# Patient Record
Sex: FEMALE | Race: BLACK OR AFRICAN AMERICAN | Employment: UNEMPLOYED | URBAN - METROPOLITAN AREA
[De-identification: names, ages, dates, MRNs, and addresses within clinical notes are randomized per-mention and may not be internally consistent; named-entity substitution may affect disease eponyms.]

---

## 2017-12-27 ENCOUNTER — HOSPITAL ENCOUNTER (OUTPATIENT)
Age: 22
Discharge: HOME | End: 2017-12-27
Payer: COMMERCIAL

## 2017-12-27 LAB — HCG QUANTITATIVE: <1 IU/L

## 2017-12-27 PROCEDURE — 36415 COLL VENOUS BLD VENIPUNCTURE: CPT

## 2017-12-27 PROCEDURE — 84702 CHORIONIC GONADOTROPIN TEST: CPT

## 2018-09-12 ENCOUNTER — HOSPITAL ENCOUNTER (EMERGENCY)
Age: 23
Discharge: HOME | End: 2018-09-12
Attending: EMERGENCY MEDICINE
Payer: COMMERCIAL

## 2018-09-12 VITALS
BODY MASS INDEX: 47.09 KG/M2 | DIASTOLIC BLOOD PRESSURE: 76 MMHG | HEART RATE: 105 BPM | RESPIRATION RATE: 17 BRPM | TEMPERATURE: 98.7 F | WEIGHT: 293 LBS | SYSTOLIC BLOOD PRESSURE: 141 MMHG | OXYGEN SATURATION: 100 % | HEIGHT: 66 IN

## 2018-09-12 DIAGNOSIS — K08.89 PAIN, DENTAL: Primary | ICD-10-CM

## 2018-09-12 PROCEDURE — 99282 EMERGENCY DEPT VISIT SF MDM: CPT

## 2018-09-12 RX ORDER — NAPROXEN 500 MG/1
500 TABLET ORAL 2 TIMES DAILY
Qty: 20 TABLET | Refills: 0 | Status: SHIPPED | OUTPATIENT
Start: 2018-09-12 | End: 2022-05-31

## 2018-09-12 RX ORDER — PENICILLIN V POTASSIUM 500 MG/1
500 TABLET ORAL 4 TIMES DAILY
Qty: 28 TABLET | Refills: 0 | Status: SHIPPED | OUTPATIENT
Start: 2018-09-12 | End: 2018-09-19

## 2018-09-12 ASSESSMENT — PAIN SCALES - GENERAL: PAINLEVEL_OUTOF10: 10

## 2018-09-12 ASSESSMENT — PAIN DESCRIPTION - PAIN TYPE: TYPE: ACUTE PAIN

## 2018-09-12 NOTE — ED PROVIDER NOTES
[address]  eMERGENCY dEPARTMENT eNCOUnter   Independent Attestation     Pt Name: Mignon Kaur  MRN: [id number]  [place] 1995  Date of evaluation: 9/12/18       Mignon Kaur is a 20 y.o. female who presents with Dental Pain (right side. )        I was personally available for consultation in the Emergency Department. Olman Chauhan MD, [address], [address] [facility],[address]  Attending Emergency Physician                    Aristeo Patel MD  09/12/18 [phone]
oriented X 3. HEAD: Atraumatic, Normocephalic. EYES: Eyes are normal to inspection, Pupils equal, round and reactive to light. NECK: Normal ROM, No jugular venous distention, No meningeal signs, Cervical spine nontender. MOUTH:  + dental pain at right lower molar , with no signs of abscess formation or Adrián sign noted. No swelling involving the airway at all. RESPIRATORY CHEST: No respiratory distress. ABDOMEN: Abdomen is nontender, No distension. UPPER EXTREMITY: Inspection normal, No cyanosis. NEURO: GCS is 15, Speech normal, Memory normal.   SKIN: Skin is warm, Skin is dry. PSYCHIATRIC: Oriented X 3, Normal affect. EMERGENCY DEPARTMENT COURSE:   Pain meds and antibiotic prescriptions. Pt provided with dental clinic list and instructed to call as soon as possible for an appointment. Instructed to return for worsening or any new symptoms including throat swelling, difficulty swallowing or breathing. Pt agrees. FINAL IMPRESSION:     1.  Pain, dental          DISPOSITION:  DISPOSITION Decision To Discharge 09/12/2018 12:56:18 PM        PATIENT REFERRED TO:  dental list    Schedule an appointment as soon as possible for a visit       [address] [facility]  [facility] [address]  [address] [facility] [address]  [phone]    For worsening symptoms, or any other concern      DISCHARGE MEDICATIONS:  Discharge Medication List as of 9/12/2018 12:58 PM      START taking these medications    Details   penicillin v potassium (VEETID) 500 MG tablet Take 1 tablet by mouth 4 times daily for 7 days, Disp-28 tablet, R-0Print      naproxen (NAPROSYN) 500 MG tablet Take 1 tablet by mouth 2 times daily, Disp-20 tablet, R-0Print             (Please note that portions of this note were completed with a voice recognition program.  Efforts were made to edit the dictations but occasionally words are mis-transcribed.)    Marina Campos, [address] [place] [address], [place]  09/12/18 7966

## 2022-05-31 ENCOUNTER — APPOINTMENT (OUTPATIENT)
Dept: CT IMAGING | Age: 27
DRG: 720 | End: 2022-05-31
Payer: COMMERCIAL

## 2022-05-31 ENCOUNTER — HOSPITAL ENCOUNTER (INPATIENT)
Age: 27
LOS: 2 days | Discharge: ANOTHER HOSPITAL | DRG: 720 | End: 2022-06-02
Attending: EMERGENCY MEDICINE | Admitting: FAMILY MEDICINE
Payer: COMMERCIAL

## 2022-05-31 DIAGNOSIS — J03.90 ACUTE TONSILLITIS, UNSPECIFIED ETIOLOGY: Primary | ICD-10-CM

## 2022-05-31 LAB
ABSOLUTE EOS #: <0.03 K/UL (ref 0–0.44)
ABSOLUTE IMMATURE GRANULOCYTE: 0.05 K/UL (ref 0–0.3)
ABSOLUTE LYMPH #: 2.67 K/UL (ref 1.1–3.7)
ABSOLUTE MONO #: 1.05 K/UL (ref 0.1–1.2)
ANION GAP SERPL CALCULATED.3IONS-SCNC: 11 MMOL/L (ref 9–17)
BASOPHILS # BLD: 0 % (ref 0–2)
BASOPHILS ABSOLUTE: <0.03 K/UL (ref 0–0.2)
BUN BLDV-MCNC: 7 MG/DL (ref 6–20)
BUN/CREAT BLD: 8 (ref 9–20)
CALCIUM SERPL-MCNC: 9.2 MG/DL (ref 8.6–10.4)
CHLORIDE BLD-SCNC: 102 MMOL/L (ref 98–107)
CHP ED QC CHECK: NORMAL
CO2: 26 MMOL/L (ref 20–31)
CREAT SERPL-MCNC: 0.93 MG/DL (ref 0.5–0.9)
EOSINOPHILS RELATIVE PERCENT: 0 % (ref 1–4)
GFR AFRICAN AMERICAN: >60 ML/MIN
GFR NON-AFRICAN AMERICAN: >60 ML/MIN
GFR SERPL CREATININE-BSD FRML MDRD: ABNORMAL ML/MIN/{1.73_M2}
GLUCOSE BLD-MCNC: 106 MG/DL (ref 70–99)
GLUCOSE BLD-MCNC: 120 MG/DL
GLUCOSE BLD-MCNC: 120 MG/DL (ref 65–105)
GLUCOSE BLD-MCNC: 149 MG/DL (ref 65–105)
HCT VFR BLD CALC: 47.2 % (ref 36.3–47.1)
HEMOGLOBIN: 14.8 G/DL (ref 11.9–15.1)
IMMATURE GRANULOCYTES: 0 %
LACTIC ACID, SEPSIS: 0.9 MMOL/L (ref 0.5–1.9)
LACTIC ACID, SEPSIS: 1 MMOL/L (ref 0.5–1.9)
LYMPHOCYTES # BLD: 18 % (ref 24–43)
MCH RBC QN AUTO: 25.8 PG (ref 25.2–33.5)
MCHC RBC AUTO-ENTMCNC: 31.4 G/DL (ref 28.4–34.8)
MCV RBC AUTO: 82.2 FL (ref 82.6–102.9)
MONOCYTES # BLD: 7 % (ref 3–12)
MONONUCLEOSIS SCREEN: NEGATIVE
PDW BLD-RTO: 16.2 % (ref 11.8–14.4)
PLATELET # BLD: 295 K/UL (ref 138–453)
PMV BLD AUTO: 8.6 FL (ref 8.1–13.5)
POTASSIUM SERPL-SCNC: 3.9 MMOL/L (ref 3.7–5.3)
PROCALCITONIN: 0.15 NG/ML
RBC # BLD: 5.74 M/UL (ref 3.95–5.11)
RBC # BLD: ABNORMAL 10*6/UL
S PYO AG THROAT QL: NEGATIVE
SEG NEUTROPHILS: 75 % (ref 36–65)
SEGMENTED NEUTROPHILS ABSOLUTE COUNT: 11.47 K/UL (ref 1.5–8.1)
SODIUM BLD-SCNC: 139 MMOL/L (ref 135–144)
SOURCE: NORMAL
WBC # BLD: 15.3 K/UL (ref 3.5–11.3)

## 2022-05-31 PROCEDURE — 6370000000 HC RX 637 (ALT 250 FOR IP): Performed by: FAMILY MEDICINE

## 2022-05-31 PROCEDURE — 2580000003 HC RX 258: Performed by: EMERGENCY MEDICINE

## 2022-05-31 PROCEDURE — 83036 HEMOGLOBIN GLYCOSYLATED A1C: CPT

## 2022-05-31 PROCEDURE — 2580000003 HC RX 258: Performed by: FAMILY MEDICINE

## 2022-05-31 PROCEDURE — 82947 ASSAY GLUCOSE BLOOD QUANT: CPT

## 2022-05-31 PROCEDURE — 96365 THER/PROPH/DIAG IV INF INIT: CPT

## 2022-05-31 PROCEDURE — 70491 CT SOFT TISSUE NECK W/DYE: CPT

## 2022-05-31 PROCEDURE — 94640 AIRWAY INHALATION TREATMENT: CPT

## 2022-05-31 PROCEDURE — 6360000004 HC RX CONTRAST MEDICATION: Performed by: EMERGENCY MEDICINE

## 2022-05-31 PROCEDURE — 80048 BASIC METABOLIC PNL TOTAL CA: CPT

## 2022-05-31 PROCEDURE — 87880 STREP A ASSAY W/OPTIC: CPT

## 2022-05-31 PROCEDURE — 6360000002 HC RX W HCPCS: Performed by: FAMILY MEDICINE

## 2022-05-31 PROCEDURE — 36415 COLL VENOUS BLD VENIPUNCTURE: CPT

## 2022-05-31 PROCEDURE — 84145 PROCALCITONIN (PCT): CPT

## 2022-05-31 PROCEDURE — 86308 HETEROPHILE ANTIBODY SCREEN: CPT

## 2022-05-31 PROCEDURE — 1200000000 HC SEMI PRIVATE

## 2022-05-31 PROCEDURE — 83605 ASSAY OF LACTIC ACID: CPT

## 2022-05-31 PROCEDURE — 99285 EMERGENCY DEPT VISIT HI MDM: CPT

## 2022-05-31 PROCEDURE — 96372 THER/PROPH/DIAG INJ SC/IM: CPT

## 2022-05-31 PROCEDURE — 87040 BLOOD CULTURE FOR BACTERIA: CPT

## 2022-05-31 PROCEDURE — 85025 COMPLETE CBC W/AUTO DIFF WBC: CPT

## 2022-05-31 PROCEDURE — 2580000003 HC RX 258: Performed by: NURSE PRACTITIONER

## 2022-05-31 PROCEDURE — 94761 N-INVAS EAR/PLS OXIMETRY MLT: CPT

## 2022-05-31 PROCEDURE — 6360000002 HC RX W HCPCS: Performed by: NURSE PRACTITIONER

## 2022-05-31 RX ORDER — POTASSIUM CHLORIDE 20 MEQ/1
40 TABLET, EXTENDED RELEASE ORAL PRN
Status: DISCONTINUED | OUTPATIENT
Start: 2022-05-31 | End: 2022-06-02 | Stop reason: HOSPADM

## 2022-05-31 RX ORDER — IBUPROFEN 800 MG/1
800 TABLET ORAL EVERY 8 HOURS PRN
Status: DISCONTINUED | OUTPATIENT
Start: 2022-05-31 | End: 2022-06-01

## 2022-05-31 RX ORDER — DEXAMETHASONE SODIUM PHOSPHATE 10 MG/ML
10 INJECTION, SOLUTION INTRAMUSCULAR; INTRAVENOUS ONCE
Status: COMPLETED | OUTPATIENT
Start: 2022-05-31 | End: 2022-05-31

## 2022-05-31 RX ORDER — INSULIN LISPRO 100 [IU]/ML
0-18 INJECTION, SOLUTION INTRAVENOUS; SUBCUTANEOUS
Status: DISCONTINUED | OUTPATIENT
Start: 2022-05-31 | End: 2022-06-02 | Stop reason: HOSPADM

## 2022-05-31 RX ORDER — SODIUM CHLORIDE 0.9 % (FLUSH) 0.9 %
10 SYRINGE (ML) INJECTION PRN
Status: DISCONTINUED | OUTPATIENT
Start: 2022-05-31 | End: 2022-06-02 | Stop reason: HOSPADM

## 2022-05-31 RX ORDER — PREDNISONE 20 MG/1
40 TABLET ORAL DAILY
Qty: 10 TABLET | Refills: 0 | Status: SHIPPED | OUTPATIENT
Start: 2022-05-31 | End: 2022-05-31

## 2022-05-31 RX ORDER — POTASSIUM CHLORIDE 7.45 MG/ML
10 INJECTION INTRAVENOUS PRN
Status: DISCONTINUED | OUTPATIENT
Start: 2022-05-31 | End: 2022-06-02 | Stop reason: HOSPADM

## 2022-05-31 RX ORDER — SODIUM CHLORIDE 9 MG/ML
INJECTION, SOLUTION INTRAVENOUS CONTINUOUS
Status: DISCONTINUED | OUTPATIENT
Start: 2022-05-31 | End: 2022-06-02 | Stop reason: HOSPADM

## 2022-05-31 RX ORDER — MAGNESIUM SULFATE 1 G/100ML
1000 INJECTION INTRAVENOUS PRN
Status: DISCONTINUED | OUTPATIENT
Start: 2022-05-31 | End: 2022-06-02 | Stop reason: HOSPADM

## 2022-05-31 RX ORDER — DEXAMETHASONE SODIUM PHOSPHATE 10 MG/ML
10 INJECTION, SOLUTION INTRAMUSCULAR; INTRAVENOUS EVERY 6 HOURS
Status: DISCONTINUED | OUTPATIENT
Start: 2022-05-31 | End: 2022-06-02 | Stop reason: HOSPADM

## 2022-05-31 RX ORDER — DEXTROSE MONOHYDRATE 50 MG/ML
100 INJECTION, SOLUTION INTRAVENOUS PRN
Status: DISCONTINUED | OUTPATIENT
Start: 2022-05-31 | End: 2022-06-02 | Stop reason: HOSPADM

## 2022-05-31 RX ORDER — ONDANSETRON 2 MG/ML
4 INJECTION INTRAMUSCULAR; INTRAVENOUS EVERY 6 HOURS PRN
Status: DISCONTINUED | OUTPATIENT
Start: 2022-05-31 | End: 2022-06-02 | Stop reason: HOSPADM

## 2022-05-31 RX ORDER — AMOXICILLIN 500 MG/1
500 CAPSULE ORAL 2 TIMES DAILY
Qty: 20 CAPSULE | Refills: 0 | Status: SHIPPED | OUTPATIENT
Start: 2022-05-31 | End: 2022-05-31

## 2022-05-31 RX ORDER — 0.9 % SODIUM CHLORIDE 0.9 %
80 INTRAVENOUS SOLUTION INTRAVENOUS ONCE
Status: COMPLETED | OUTPATIENT
Start: 2022-05-31 | End: 2022-05-31

## 2022-05-31 RX ORDER — SODIUM CHLORIDE 0.9 % (FLUSH) 0.9 %
5-40 SYRINGE (ML) INJECTION EVERY 12 HOURS SCHEDULED
Status: DISCONTINUED | OUTPATIENT
Start: 2022-05-31 | End: 2022-06-02 | Stop reason: HOSPADM

## 2022-05-31 RX ORDER — ACETAMINOPHEN 325 MG/1
650 TABLET ORAL EVERY 6 HOURS PRN
Status: DISCONTINUED | OUTPATIENT
Start: 2022-05-31 | End: 2022-06-02 | Stop reason: HOSPADM

## 2022-05-31 RX ORDER — ENOXAPARIN SODIUM 100 MG/ML
30 INJECTION SUBCUTANEOUS 2 TIMES DAILY
Status: DISCONTINUED | OUTPATIENT
Start: 2022-05-31 | End: 2022-06-02 | Stop reason: HOSPADM

## 2022-05-31 RX ORDER — SODIUM CHLORIDE 9 MG/ML
INJECTION, SOLUTION INTRAVENOUS PRN
Status: DISCONTINUED | OUTPATIENT
Start: 2022-05-31 | End: 2022-06-02 | Stop reason: HOSPADM

## 2022-05-31 RX ORDER — SODIUM CHLORIDE 0.9 % (FLUSH) 0.9 %
10 SYRINGE (ML) INJECTION ONCE
Status: COMPLETED | OUTPATIENT
Start: 2022-05-31 | End: 2022-05-31

## 2022-05-31 RX ORDER — MORPHINE SULFATE 2 MG/ML
2 INJECTION, SOLUTION INTRAMUSCULAR; INTRAVENOUS EVERY 4 HOURS PRN
Status: DISCONTINUED | OUTPATIENT
Start: 2022-05-31 | End: 2022-06-02 | Stop reason: HOSPADM

## 2022-05-31 RX ORDER — ALBUTEROL SULFATE 90 UG/1
1 AEROSOL, METERED RESPIRATORY (INHALATION) EVERY 4 HOURS PRN
Status: DISCONTINUED | OUTPATIENT
Start: 2022-05-31 | End: 2022-06-02 | Stop reason: HOSPADM

## 2022-05-31 RX ORDER — ONDANSETRON 4 MG/1
4 TABLET, ORALLY DISINTEGRATING ORAL EVERY 8 HOURS PRN
Status: DISCONTINUED | OUTPATIENT
Start: 2022-05-31 | End: 2022-06-02 | Stop reason: HOSPADM

## 2022-05-31 RX ORDER — SODIUM CHLORIDE 9 MG/ML
INJECTION, SOLUTION INTRAVENOUS CONTINUOUS
Status: DISCONTINUED | OUTPATIENT
Start: 2022-05-31 | End: 2022-05-31

## 2022-05-31 RX ORDER — ACETAMINOPHEN 650 MG/1
650 SUPPOSITORY RECTAL EVERY 6 HOURS PRN
Status: DISCONTINUED | OUTPATIENT
Start: 2022-05-31 | End: 2022-06-02 | Stop reason: HOSPADM

## 2022-05-31 RX ORDER — CEFAZOLIN SODIUM 1 G/3ML
1000 INJECTION, POWDER, FOR SOLUTION INTRAMUSCULAR; INTRAVENOUS ONCE
Status: DISCONTINUED | OUTPATIENT
Start: 2022-05-31 | End: 2022-05-31

## 2022-05-31 RX ORDER — FLUTICASONE PROPIONATE 110 UG/1
1 AEROSOL, METERED RESPIRATORY (INHALATION) 2 TIMES DAILY
Status: DISCONTINUED | OUTPATIENT
Start: 2022-05-31 | End: 2022-06-02 | Stop reason: HOSPADM

## 2022-05-31 RX ORDER — INSULIN LISPRO 100 [IU]/ML
0-9 INJECTION, SOLUTION INTRAVENOUS; SUBCUTANEOUS NIGHTLY
Status: DISCONTINUED | OUTPATIENT
Start: 2022-05-31 | End: 2022-06-02 | Stop reason: HOSPADM

## 2022-05-31 RX ADMIN — SODIUM CHLORIDE, PRESERVATIVE FREE 10 ML: 5 INJECTION INTRAVENOUS at 15:30

## 2022-05-31 RX ADMIN — SODIUM CHLORIDE: 9 INJECTION, SOLUTION INTRAVENOUS at 21:39

## 2022-05-31 RX ADMIN — INSULIN LISPRO 2 UNITS: 100 INJECTION, SOLUTION INTRAVENOUS; SUBCUTANEOUS at 21:35

## 2022-05-31 RX ADMIN — CEFAZOLIN SODIUM 1000 MG: 1 INJECTION, POWDER, FOR SOLUTION INTRAMUSCULAR; INTRAVENOUS at 15:53

## 2022-05-31 RX ADMIN — ENOXAPARIN SODIUM 30 MG: 100 INJECTION SUBCUTANEOUS at 21:36

## 2022-05-31 RX ADMIN — DEXAMETHASONE SODIUM PHOSPHATE 10 MG: 10 INJECTION, SOLUTION INTRAMUSCULAR; INTRAVENOUS at 13:19

## 2022-05-31 RX ADMIN — SODIUM CHLORIDE: 9 INJECTION, SOLUTION INTRAVENOUS at 16:36

## 2022-05-31 RX ADMIN — FLUTICASONE PROPIONATE 1 PUFF: 110 AEROSOL, METERED RESPIRATORY (INHALATION) at 21:31

## 2022-05-31 RX ADMIN — DEXAMETHASONE SODIUM PHOSPHATE 10 MG: 10 INJECTION, SOLUTION INTRAMUSCULAR; INTRAVENOUS at 19:04

## 2022-05-31 RX ADMIN — IOPAMIDOL 75 ML: 755 INJECTION, SOLUTION INTRAVENOUS at 15:30

## 2022-05-31 RX ADMIN — SODIUM CHLORIDE 80 ML: 9 INJECTION, SOLUTION INTRAVENOUS at 15:30

## 2022-05-31 ASSESSMENT — PAIN DESCRIPTION - PAIN TYPE: TYPE: ACUTE PAIN

## 2022-05-31 ASSESSMENT — PAIN - FUNCTIONAL ASSESSMENT
PAIN_FUNCTIONAL_ASSESSMENT: PREVENTS OR INTERFERES SOME ACTIVE ACTIVITIES AND ADLS
PAIN_FUNCTIONAL_ASSESSMENT: 0-10

## 2022-05-31 ASSESSMENT — ENCOUNTER SYMPTOMS
COUGH: 0
SHORTNESS OF BREATH: 0
PHOTOPHOBIA: 0
NAUSEA: 0
EYE PAIN: 0
VOICE CHANGE: 0
ABDOMINAL PAIN: 0
SINUS PRESSURE: 0
SORE THROAT: 1
VOMITING: 0
RHINORRHEA: 0
DIARRHEA: 0
COLOR CHANGE: 0

## 2022-05-31 ASSESSMENT — PAIN DESCRIPTION - ORIENTATION: ORIENTATION: UPPER

## 2022-05-31 ASSESSMENT — PAIN SCALES - GENERAL
PAINLEVEL_OUTOF10: 10
PAINLEVEL_OUTOF10: 6

## 2022-05-31 ASSESSMENT — PAIN DESCRIPTION - ONSET: ONSET: ON-GOING

## 2022-05-31 ASSESSMENT — PAIN DESCRIPTION - FREQUENCY: FREQUENCY: CONTINUOUS

## 2022-05-31 ASSESSMENT — PAIN DESCRIPTION - LOCATION: LOCATION: THROAT

## 2022-05-31 NOTE — ED NOTES
ED to inpatient nurses report     Chief Complaint   Patient presents with    Pharyngitis     Since yesterday; hx of strep       Present to ED from home with c/o sore throat since yesterday. CT showed tonsillitis and abscess on right tonsil. LOC: alert and orientated to name, place, date  Vital signs   Vitals:    05/31/22 1239 05/31/22 1626   BP: 138/81    Pulse: (!) 120 (!) 108   Resp: 15    Temp: 99 °F (37.2 °C)    TempSrc: Oral    SpO2: 98%    Weight: 245 lb (111.1 kg)    Height: 5' 5\" (1.651 m)       Oxygen Baseline none    Current needs required none  LDAs:   Peripheral IV 05/31/22 Left Antecubital (Active)     Mobility: Independent  Pending ED orders: none  Present condition: stable  Code Status: Full  Consults: IP CONSULT TO INTERNAL MEDICINE  IP CONSULT TO OTOLARYNGOLOGY  IP CONSULT TO INFECTIOUS DISEASES  [x]  Hospitalist  Completed  [x] yes [] no Who: Sundeep  []  Medicine  Completed  [] yes [] No Who:   []  Cardiology  Completed  [] yes [] No Who:   []  GI   Completed  [] yes [] No Who:   []  Neurology  Completed  [] yes [] No Who:   []  Nephrology Completed  [] yes [] No Who:    []  Vascular  Completed  [] yes [] No Who:   []  Ortho  Completed  [] yes [] No Who:     []  Surgery  Completed  [] yes [] No Who:    []  Urology  Completed  [] yes [] No Who:    []  CT Surgery Completed  [] yes [] No Who:   []  Podiatry  Completed  [] yes [] No Who:    [x]  Other    Completed  [x] yes [] No Who: Infectious disease - Kris  Interventions: 125 mL/hr continuous normal saline. 1g Ancef IV. 10mg Decadron IM. Important Events: Otolaryngology consult not complete.         Electronically signed by Aruna Dejesus RN on 5/31/2022 at 5:29 PM       Aruna Dejesus RN  05/31/22 4465

## 2022-05-31 NOTE — PROGRESS NOTES
Transitions of Care Pharmacy Service   Medication Review    The patient's list of current home medications has been reviewed. Source(s) of information: spoke to patient, [address]     Based on information provided by the above source(s), I have updated the patient's home med list as described below. I changed or updated the following medications on the patient's home medication list:  Discontinued amoxicillin (AMOXIL) 500 mg capsule  predniSONE (DELTASONE) 20 MG tablet  naproxen (NAPROSYN) 500 MG tablet  metformin (GLUCOPHAGE) 500 MG tablet  ibuprofen (IBU) 800 MG tablet     Added None      Adjusted   None      Other Notes Patient stated that she still uses a FLOVENT  MCG/ACT inhale. I was not able to confirm with [address] that she has ever filled this. Patient stated that she still uses a PROAIR  (90 BASE) MCG/ACT inhaler. This medication has not been filled since 2015            Please feel free to call me with any questions about this encounter. Thank you. This note will be reviewed and co-signed by the Transitions of Care Pharmacist. The pharmacist will review inpatient orders and contact the physician about any discrepancies. Burak Aiken, pharmacy technician  Transitions of Care Pharmacy Service  Phone:  [phone]  Fax: [phone]      Electronically signed by Burak Aiken on 5/31/2022 at 5:10 PM         Prior to Admission medications    Medication Sig Start Date End Date Taking?  Authorizing Provider   FLOVENT  MCG/ACT inhaler INHALE TWO PUFFS TWICE DAILY       PROAIR  (90 BASE) MCG/ACT inhaler INHALE ONE TO TWO PUFFS FOUR TIMES DAILY

## 2022-05-31 NOTE — ED PROVIDER NOTES
[facility] ED  eMERGENCY dEPARTMENT eNCOUnter      Pt Name: Gustabo Ortega  MRN: [id number]  [place] 1995  Date of evaluation: 5/31/2022  Provider: REED Biggs CNP    CHIEF COMPLAINT       Chief Complaint   Patient presents with    Pharyngitis     Since yesterday; hx of strep          HISTORY OF PRESENT ILLNESS  (Location/Symptom, Timing/Onset, Context/Setting, Quality, Duration, Modifying Factors, Severity.)   Gustabo Ortega is a 27 y.o. female who presents to the emergency department via private auto for a sore throat. Onset was yesterday. Denies fever, cough, congestion, SOB. Rates her pain 10/10 at this time. Reports HA, fatigue, chills. Denies chance of pregnancy. She has a hx of strep. She took motrin yesterday for her HA. Nursing Notes were reviewed. ALLERGIES     Patient has no known allergies. CURRENT MEDICATIONS       Current Discharge Medication List      CONTINUE these medications which have NOT CHANGED    Details   naproxen (NAPROSYN) 500 MG tablet Take 1 tablet by mouth 2 times daily  Qty: 20 tablet, Refills: 0      metformin (GLUCOPHAGE) 500 MG tablet Take 1 tablet by mouth 2 times daily (with meals). Qty: 60 tablet, Refills: 3    Associated Diagnoses: DM (diabetes mellitus), type 2 ([address] Insulin resistance      FLOVENT  MCG/ACT inhaler INHALE TWO PUFFS TWICE DAILY  Qty: 12 g, Refills: 3      PROAIR  (90 BASE) MCG/ACT inhaler INHALE ONE TO TWO PUFFS FOUR TIMES DAILY  Qty: 8.5 g, Refills: 3      ibuprofen (IBU) 800 MG tablet Take 1 tablet by mouth every 8 hours as needed for Pain. Qty: 90 tablet, Refills: 3    Associated Diagnoses: Sprain lumbar region; DJD (degenerative joint disease), lumbosacral             PAST MEDICAL HISTORY         Diagnosis Date    Asthma     DM (diabetes mellitus), type 2 ([address] 8/12/2013    Headache(784.0)     Obesity        SURGICAL HISTORY     No past surgical history on file.       FAMILY HISTORY No family history on file. No family status information on file. SOCIAL HISTORY      reports that she has never smoked. She has never used smokeless tobacco.    REVIEW OF SYSTEMS    (2-9 systems for level 4, 10 or more for level 5)     Review of Systems   Constitutional: Positive for chills and fatigue. Negative for diaphoresis and fever. HENT: Positive for sore throat. Negative for congestion, ear discharge, ear pain, postnasal drip, rhinorrhea, sinus pressure and voice change. Eyes: Negative for photophobia, pain and visual disturbance. Respiratory: Negative for cough and shortness of breath. Cardiovascular: Negative for chest pain. Gastrointestinal: Negative for abdominal pain, diarrhea, nausea and vomiting. Musculoskeletal: Negative for arthralgias, myalgias, neck pain and neck stiffness. Skin: Negative for color change and rash. Neurological: Positive for headaches. Negative for weakness. Except as noted above the remainder of the review of systems was reviewed and negative. PHYSICAL EXAM    (up to 7 for level 4, 8 or more for level 5)     ED Triage Vitals [05/31/22 1239]   BP Temp Temp Source Heart Rate Resp SpO2 Height Weight   138/81 99 °F (37.2 °C) Oral (!) 120 15 98 % 5' 5\" (1.651 m) 245 lb (111.1 kg)     Physical Exam  Vitals reviewed. Constitutional:       General: She is not in acute distress. Appearance: She is well-developed. She is not diaphoretic. HENT:      Mouth/Throat:      Mouth: Mucous membranes are moist.      Pharynx: Uvula midline. Oropharyngeal exudate and posterior oropharyngeal erythema present. No uvula swelling. Tonsils: Tonsillar exudate present. No tonsillar abscesses. 2+ on the right. 2+ on the left. Eyes:      Conjunctiva/sclera: Conjunctivae normal.   Cardiovascular:      Rate and Rhythm: Normal rate and regular rhythm. Heart sounds: Normal heart sounds.    Pulmonary:      Effort: Pulmonary effort is normal. No respiratory distress. Breath sounds: Normal breath sounds. No wheezing or rales. Skin:     General: Skin is warm and dry. Findings: No rash. Neurological:      Mental Status: She is alert and oriented to person, place, and time. Psychiatric:         Behavior: Behavior normal.         DIAGNOSTIC RESULTS     RADIOLOGY:   Non-plain film images such as CT, Ultrasound and MRI are read by the radiologist. Plain radiographic images are visualized and preliminarily interpreted by the emergency physician with the below findings:    Interpretation per the Radiologist below, if available at the time of this note:    CT SOFT TISSUE NECK W CONTRAST    Result Date: 5/31/2022  EXAMINATION: CT OF THE NECK SOFT TISSUE WITH CONTRAST  5/31/2022 TECHNIQUE: CT of the neck was performed with the administration of intravenous contrast. Multiplanar reformatted images are provided for review. Automated exposure control, iterative reconstruction, and/or weight based adjustment of the mA/kV was utilized to reduce the radiation dose to as low as reasonably achievable. COMPARISON: None. HISTORY: ORDERING SYSTEM PROVIDED HISTORY: rule out abscess FINDINGS: PHARYNX/LARYNX:  The palatine tonsils are markedly enlarged and demonstrate striated enhancement. There are patchy areas of low attenuation concerning for early tonsillar/peritonsillar abscess formation on the right with intervening areas of soft tissue measuring 2.0 x 2.0 cm and conglomerate transaxial measurements. There is focal moderate to severe sumit pharyngeal narrowing. The tongue is normal in appearance within constraints of streak artifact associated with the dental amalgam.  The valleculae, epiglottis, aryepiglottic folds and pyriform sinuses appear unremarkable. The true and false vocal cords are normal in appearance. No mass or abscess is seen. SALIVARY GLANDS/THYROID:  The parotid and submandibular glands appear unremarkable. The thyroid gland appears unremarkable.  LYMPH NODES:  Mild cervical lymphadenopathy within the right jugulodigastric lymph node station, likely reactive. SOFT TISSUES:  No appreciable soft tissue swelling or mass is seen. BRAIN/ORBITS/SINUSES:  The visualized portion of the intracranial contents appear unremarkable. The visualized portion of the orbits, paranasal sinuses and mastoid air cells demonstrate no acute abnormality. LUNG APICES/SUPERIOR MEDIASTINUM:  No focal consolidation is seen within the visualized lung apices. No superior mediastinal lymphadenopathy or mass. The visualized portion of the trachea appears unremarkable. BONES:  No aggressive appearing lytic or blastic bony lesion. 1. Extensive pharyngitis/tonsillitis with patchy areas of low attenuation concerning for early tonsillar/peritonsillar abscess formation on the right with intervening areas of soft tissue suggesting phlegmonous components with conglomerate measurement of 2 x 2 cm in transaxial dimensions. 2. Mild right-sided cervical lymphadenopathy, likely reactive. LABS:  Labs Reviewed   CBC WITH AUTO DIFFERENTIAL - Abnormal; Notable for the following components:       Result Value    WBC 15.3 (*)     RBC 5.74 (*)     Hematocrit 47.2 (*)     MCV 82.2 (*)     RDW 16.2 (*)     Seg Neutrophils 75 (*)     Lymphocytes 18 (*)     Eosinophils % 0 (*)     Segs Absolute 11.47 (*)     All other components within normal limits   BASIC METABOLIC PANEL - Abnormal; Notable for the following components:    Glucose 106 (*)     CREATININE 0.93 (*)     Bun/Cre Ratio 8 (*)     All other components within normal limits   STREP SCREEN GROUP A THROAT   MONONUCLEOSIS SCREEN       All other labs were within normal range or not returned as of this dictation.     EMERGENCY DEPARTMENT COURSE and DIFFERENTIAL DIAGNOSIS/MDM:   Vitals:    Vitals:    05/31/22 1239 05/31/22 1626   BP: 138/81    Pulse: (!) 120 (!) 108   Resp: 15    Temp: 99 °F (37.2 °C)    TempSrc: Oral    SpO2: 98%    Weight: 245 lb (111.1 kg)    Height: 5' 5\" (1.651 m)        MEDICATIONS GIVEN IN THE ED:  Medications   0.9 % sodium chloride infusion ( IntraVENous New Bag 5/31/22 1636)   dexamethasone (PF) (DECADRON) injection 10 mg (10 mg IntraMUSCular Given 5/31/22 1319)   ceFAZolin (ANCEF) 1,000 mg in dextrose 5 % 50 mL IVPB (mini-bag) (0 mg IntraVENous Stopped 5/31/22 1620)   0.9 % sodium chloride bolus (0 mLs IntraVENous Stopped 5/31/22 1630)   iopamidol (ISOVUE-370) 76 % injection 75 mL (75 mLs IntraVENous Given 5/31/22 1530)   sodium chloride flush 0.9 % injection 10 mL (10 mLs IntraVENous Given 5/31/22 1530)       CLINICAL DECISION MAKING:  The patient presented alert with a nontoxic appearance and was seen in conjunction with Dr. [place]. Imaging showed tonsillitis with concern for early peritonsillar abscess. She will be admitted for further evaluation and treatment. Antibiotics were started here in the ED. I spoke with Dr. Will Donovan. Care was provided during an unprecedented national emergency due to the novel coronavirus, Covid-19. CONSULTS:  IP CONSULT TO INTERNAL MEDICINE      FINAL IMPRESSION      1. Acute tonsillitis, unspecified etiology            Problem List  Patient Active Problem List   Diagnosis Code    Bronchial asthma J45.909    Childhood obesity E66.9      E66.9    Allergic rhinitis J30.9    Morbid obesity ([facility] E66.01    Nonorganic enuresis F98.0    Irritant dermatitis L24.9    Epistaxis R04.0    Sprain T14. 8XXA    Headache R51.9    Sprain, lumbosacral S33. 9XXA    DUB (dysfunctional uterine bleeding) N93.8    Fatigue R53.83    Lumbar back sprain S33. 5XXA    DM (diabetes mellitus), type 2 ([address] E11.9    Insulin resistance E88.81    Obesity, childhood E66.9         DISPOSITION/PLAN   DISPOSITION Decision To Admit 05/31/2022 04:24:51 PM      PATIENT REFERRED TO:   No follow-up provider specified.     DISCHARGE MEDICATIONS:     Current Discharge Medication List              (Please note that portions of this note were completed with a voice recognition program.  Efforts were made to edit the dictations but occasionally words are mis-transcribed.)    REED Petersen - REED Allan CNP  05/31/22 [id number]

## 2022-05-31 NOTE — H&P
History & Physical  [facility]    Adult Hospitalist      Name: Isai Chisholm  MRN: [id number]     Acct: [de-identified]  Room: [place] [address]    Admit Date: 5/31/2022 12:41 PM  PCP: Dakotah Ye MD    Primary Problem  Principal Problem: Tonsillitis  Resolved Problems:    * No resolved hospital problems. *        Assesment:     · Tonsillitis  · Peritonsillar/tonsillar abscess  · Diabetes type 2  · Asthma  · Obesity        Plan:     · Admitted to [place] [address] with telemetry  · O2 maintain oxygen saturation greater than 92%  ·   · Monitor airway  · Blood culture  · Throat culture  · Pain control  · IV Decadron  · Empiric antibiotics as below  · ENT consult for possible abscess drainage  · ID consult  ·   ·   · Continue to monitor/telemetry/CBC with differential daily/BMP daily  · DVT and GI prophylaxis. Continue medications as below      Scheduled Meds:   [START ON 6/1/2022] ceFAZolin  2,000 mg IntraVENous Q8H    dexamethasone  10 mg IntraVENous Q6H    insulin lispro  0-18 Units SubCUTAneous TID WC    insulin lispro  0-9 Units SubCUTAneous Nightly     Continuous Infusions:   sodium chloride 125 mL/hr at 05/31/22 1636    dextrose       PRN Meds:  glucose, 4 tablet, PRN  dextrose bolus, 125 mL, PRN   Or  dextrose bolus, 250 mL, PRN  glucagon (rDNA), 1 mg, PRN  dextrose, 100 mL/hr, PRN        Chief Complaint:     Chief Complaint   Patient presents with    Pharyngitis     Since yesterday; hx of strep          History of Present Illness:      Isai Chisholm is a 27 y.o.  female who presents with Pharyngitis (Since yesterday; hx of strep )    78-year-old lady with past medical history of asthma, diabetes presented to ER complaining of sore throat. Said the symptoms are going on for 1 day. Patient denies any cough, fever, congestion, shortness of breath. However she is complaining of throat pain and rated as 10/10, sharp, severe. She is also complaining of headache, fatigue, chills.   On presentation WBC was 15.3. Patient was maintaining airway and was able to swallow saliva. CT neck was significant for extensive pharyngitis/tonsillitis with patchy areas of low-attenuation  tonsillar/peritonsillar abscess formation on the right with intervening areas of soft tissue suggesting phlegmonous components. , conglomerate  measurement of 2X 2 cm. I have personally reviewed the past medical history, past surgical history, medications, social history, and family history, and summarized in the note. Review of Systems:     All 10 point system is reviewed and negative otherwise mentioned in HPI. Past Medical History:     Past Medical History:   Diagnosis Date    Asthma     DM (diabetes mellitus), type 2 ([facility] 2013    Headache(784.0)     Obesity         Past Surgical History:     No past surgical history on file. Medications Prior to Admission:       Prior to Admission medications    Medication Sig Start Date End Date Taking? Authorizing Provider   FLOVENT  MCG/ACT inhaler INHALE TWO PUFFS TWICE DAILY 13   Raffi Borja MD   PROAIR  (90 BASE) MCG/ACT inhaler INHALE ONE TO TWO PUFFS FOUR TIMES DAILY 13   Raffi Borja MD        Allergies:       Patient has no known allergies. Social History:     Tobacco:    reports that she has never smoked. She has never used smokeless tobacco.  Alcohol:      has no history on file for alcohol use. Drug Use:  has no history on file for drug use. Family History:     No family history on file.       Physical Exam:     Vitals:  /81   Pulse (!) 108   Temp 99 °F (37.2 °C) (Oral)   Resp 15   Ht 5' 5\" (1.651 m)   Wt 245 lb (111.1 kg)   SpO2 98%   BMI 40.77 kg/m²   Temp (24hrs), Av °F (37.2 °C), Min:99 °F (37.2 °C), Max:99 °F (37.2 °C)          General appearance - alert, well appearing, and in no acute distress  Mental status - oriented to person, place, and time with normal affect  Head - normocephalic and atraumatic  Eyes - pupils equal and reactive, extraocular eye movements intact, conjunctiva clear  Ears - hearing appears to be intact  Nose - no drainage noted  Mouth -bilateral tonsillar enlargement  Neck -cervical lymphadenopathy  Chest - clear to auscultation, normal effort  Heart - normal rate, regular rhythm, no murmur  Abdomen - soft, nontender, nondistended, bowel sounds present all four quadrants, no masses, hepatomegaly or splenomegaly  Neurological - normal speech, no focal findings or movement disorder noted, cranial nerves II through XII grossly intact  Extremities - peripheral pulses palpable, no pedal edema or calf pain with palpation  Skin - no gross lesions, rashes, or induration noted        Data:     Labs:    Hematology:  Recent Labs     05/31/22  1320   WBC 15.3*   RBC 5.74*   HGB 14.8   HCT 47.2*   MCV 82.2*   MCH 25.8   MCHC 31.4   RDW 16.2*      MPV 8.6     Chemistry:  Recent Labs     05/31/22  1320      K 3.9      CO2 26   GLUCOSE 106*   BUN 7   CREATININE 0.93*   ANIONGAP 11   LABGLOM >60   GFRAA >60   CALCIUM 9.2     No results for input(s): PROT, LABALBU, LABA1C, D4YVMBU, A1ZGCUZ, FT4, TSH, AST, ALT, LDH, GGT, ALKPHOS, LABGGT, BILITOT, BILIDIR, AMMONIA, AMYLASE, LIPASE, LACTATE, CHOL, HDL, LDLCHOLESTEROL, CHOLHDLRATIO, TRIG, VLDL, SMM40GK, PHENYTOIN, PHENYF, URICACID, POCGLU in the last 72 hours. No results found for: INR, PROTIME    No results found for: SPECIAL  No results found for: CULTURE    No results found for: POCPH, PHART, PH, POCPCO2, TNR3SKC, PCO2, POCPO2, PO2ART, PO2, POCHCO3, ZKY9VGP, HCO3, NBEA, PBEA, BEART, BE, THGBART, THB, YUZ7OBW, TTKL9IPJ, B9VESALN, O2SAT, FIO2    Radiology:    CT SOFT TISSUE NECK W CONTRAST    Result Date: 5/31/2022  1.  Extensive pharyngitis/tonsillitis with patchy areas of low attenuation concerning for early tonsillar/peritonsillar abscess formation on the right with intervening areas of soft tissue suggesting phlegmonous components with conglomerate measurement of 2 x 2 cm in transaxial dimensions. 2. Mild right-sided cervical lymphadenopathy, likely reactive. All radiological studies reviewed                Code Status:  No Order    Electronically signed by Felix Yoder MD on 5/31/2022 at 5:18 PM     Copy sent to Dr. Rajesh Nina MD    This note was created with the assistance of a speech-recognition program.  Although the intention is to generate a document that actually reflects the content of the visit, no guarantees can be provided that every mistake has been identified and corrected by editing. Note was updated later by me after  physical examination and  completion of the assessment.

## 2022-05-31 NOTE — ED PROVIDER NOTES
EMERGENCY DEPARTMENT ENCOUNTER   ATTENDING ATTESTATION     Pt Name: Sarah Kirby  MRN: [id number]  [place] 1995  Date of evaluation: 5/31/22   Sarah Kirby is a 27 y.o. female with CC: Pharyngitis (Since yesterday; hx of strep )    MDM:   Patient is a 68-year-old female who presented to the emergency department secondary to strep throat. Rapid strep and mono negative, CT soft tissue neck obtained; developing abscess, patient no area out of compromise however will be admitted for IV antibiotics and overnight observation. [facility]. This visit was performed by both a physician and an APC. I personally evaluated and examined the patient. I performed all aspects of the MDM as documented. CRITICAL CARE:       EKG: All EKG's are interpreted by the Emergency Department Physician who either signs or Co-signs this chart in the absence of a cardiologist.      RADIOLOGY:All plain film, CT, MRI, and formal ultrasound images (except ED bedside ultrasound) are read by the radiologist, see reports below, unless otherwise noted in MDM or here. No orders to display     LABS: All lab results were reviewed by myself, and all abnormals are listed below. Labs Reviewed   STREP SCREEN GROUP A THROAT   MONONUCLEOSIS SCREEN   CBC WITH AUTO DIFFERENTIAL   BASIC METABOLIC PANEL     CONSULTS:  None  FINAL IMPRESSION    No diagnosis found. PASTMEDICAL HISTORY     Past Medical History:   Diagnosis Date    Asthma     DM (diabetes mellitus), type 2 ([facility] [address] 8/12/2013    Headache(784.0)     Obesity      SURGICAL HISTORY     No past surgical history on file. CURRENT MEDICATIONS       Previous Medications    FLOVENT  MCG/ACT INHALER    INHALE TWO PUFFS TWICE DAILY    IBUPROFEN (IBU) 800 MG TABLET    Take 1 tablet by mouth every 8 hours as needed for Pain. METFORMIN (GLUCOPHAGE) 500 MG TABLET    Take 1 tablet by mouth 2 times daily (with meals).     NAPROXEN (NAPROSYN) 500 MG TABLET    Take 1 tablet by mouth 2 times daily    PROAIR  (90 BASE) MCG/ACT INHALER    INHALE ONE TO TWO PUFFS FOUR TIMES DAILY     ALLERGIES     has No Known Allergies. FAMILY HISTORY     has no family status information on file. SOCIAL HISTORY       Social History     Tobacco Use    Smoking status: Never Smoker    Smokeless tobacco: Never Used   Substance Use Topics    Alcohol use: Not on file    Drug use: Not on file          Andrzej Leon MD  The care is provided during an unprecedented national emergency due to the novel coronavirus, COVID 19.   Attending Emergency Physician          Andrzej Leon MD  11/03/88 [id number]

## 2022-06-01 LAB
ABSOLUTE EOS #: <0.03 K/UL (ref 0–0.44)
ABSOLUTE IMMATURE GRANULOCYTE: 0.11 K/UL (ref 0–0.3)
ABSOLUTE LYMPH #: 1.93 K/UL (ref 1.1–3.7)
ABSOLUTE MONO #: 0.3 K/UL (ref 0.1–1.2)
ANION GAP SERPL CALCULATED.3IONS-SCNC: 13 MMOL/L (ref 9–17)
BASOPHILS # BLD: 0 % (ref 0–2)
BASOPHILS ABSOLUTE: 0.04 K/UL (ref 0–0.2)
BUN BLDV-MCNC: 9 MG/DL (ref 6–20)
BUN/CREAT BLD: 11 (ref 9–20)
CALCIUM SERPL-MCNC: 9.5 MG/DL (ref 8.6–10.4)
CHLORIDE BLD-SCNC: 103 MMOL/L (ref 98–107)
CO2: 21 MMOL/L (ref 20–31)
CREAT SERPL-MCNC: 0.83 MG/DL (ref 0.5–0.9)
EOSINOPHILS RELATIVE PERCENT: 0 % (ref 1–4)
ESTIMATED AVERAGE GLUCOSE: 126 MG/DL
GFR AFRICAN AMERICAN: >60 ML/MIN
GFR NON-AFRICAN AMERICAN: >60 ML/MIN
GFR SERPL CREATININE-BSD FRML MDRD: ABNORMAL ML/MIN/{1.73_M2}
GLUCOSE BLD-MCNC: 103 MG/DL (ref 65–105)
GLUCOSE BLD-MCNC: 128 MG/DL (ref 65–105)
GLUCOSE BLD-MCNC: 135 MG/DL (ref 65–105)
GLUCOSE BLD-MCNC: 148 MG/DL (ref 70–99)
GLUCOSE BLD-MCNC: 151 MG/DL (ref 65–105)
HBA1C MFR BLD: 6 % (ref 4–6)
HCT VFR BLD CALC: 37.7 % (ref 36.3–47.1)
HEMOGLOBIN: 12 G/DL (ref 11.9–15.1)
IMMATURE GRANULOCYTES: 1 %
INR BLD: 1.2
LYMPHOCYTES # BLD: 9 % (ref 24–43)
MCH RBC QN AUTO: 26 PG (ref 25.2–33.5)
MCHC RBC AUTO-ENTMCNC: 31.8 G/DL (ref 28.4–34.8)
MCV RBC AUTO: 81.6 FL (ref 82.6–102.9)
MONOCYTES # BLD: 1 % (ref 3–12)
NRBC AUTOMATED: 0 PER 100 WBC
PDW BLD-RTO: 15.1 % (ref 11.8–14.4)
PLATELET # BLD: 380 K/UL (ref 138–453)
PMV BLD AUTO: 8.6 FL (ref 8.1–13.5)
POTASSIUM SERPL-SCNC: 3.9 MMOL/L (ref 3.7–5.3)
PROTHROMBIN TIME: 15.1 SEC (ref 11.5–14.2)
RBC # BLD: 4.62 M/UL (ref 3.95–5.11)
RBC # BLD: ABNORMAL 10*6/UL
SEG NEUTROPHILS: 89 % (ref 36–65)
SEGMENTED NEUTROPHILS ABSOLUTE COUNT: 19.48 K/UL (ref 1.5–8.1)
SODIUM BLD-SCNC: 137 MMOL/L (ref 135–144)
WBC # BLD: 21.9 K/UL (ref 3.5–11.3)

## 2022-06-01 PROCEDURE — 80048 BASIC METABOLIC PNL TOTAL CA: CPT

## 2022-06-01 PROCEDURE — 36415 COLL VENOUS BLD VENIPUNCTURE: CPT

## 2022-06-01 PROCEDURE — 2580000003 HC RX 258: Performed by: NURSE PRACTITIONER

## 2022-06-01 PROCEDURE — 6360000002 HC RX W HCPCS: Performed by: FAMILY MEDICINE

## 2022-06-01 PROCEDURE — 99254 IP/OBS CNSLTJ NEW/EST MOD 60: CPT | Performed by: NURSE PRACTITIONER

## 2022-06-01 PROCEDURE — 6360000002 HC RX W HCPCS: Performed by: NURSE PRACTITIONER

## 2022-06-01 PROCEDURE — 94761 N-INVAS EAR/PLS OXIMETRY MLT: CPT

## 2022-06-01 PROCEDURE — 85610 PROTHROMBIN TIME: CPT

## 2022-06-01 PROCEDURE — 82947 ASSAY GLUCOSE BLOOD QUANT: CPT

## 2022-06-01 PROCEDURE — 94640 AIRWAY INHALATION TREATMENT: CPT

## 2022-06-01 PROCEDURE — 85025 COMPLETE CBC W/AUTO DIFF WBC: CPT

## 2022-06-01 PROCEDURE — 1200000000 HC SEMI PRIVATE

## 2022-06-01 PROCEDURE — 6370000000 HC RX 637 (ALT 250 FOR IP): Performed by: FAMILY MEDICINE

## 2022-06-01 PROCEDURE — 87081 CULTURE SCREEN ONLY: CPT

## 2022-06-01 PROCEDURE — 2580000003 HC RX 258: Performed by: FAMILY MEDICINE

## 2022-06-01 RX ORDER — INSULIN LISPRO 100 [IU]/ML
0-9 INJECTION, SOLUTION INTRAVENOUS; SUBCUTANEOUS NIGHTLY
Status: CANCELLED | OUTPATIENT
Start: 2022-06-01

## 2022-06-01 RX ORDER — MORPHINE SULFATE 2 MG/ML
2 INJECTION, SOLUTION INTRAMUSCULAR; INTRAVENOUS EVERY 4 HOURS PRN
Status: CANCELLED | OUTPATIENT
Start: 2022-06-01

## 2022-06-01 RX ORDER — ACETAMINOPHEN 325 MG/1
650 TABLET ORAL EVERY 6 HOURS PRN
Status: CANCELLED | OUTPATIENT
Start: 2022-06-01

## 2022-06-01 RX ORDER — DEXTROSE MONOHYDRATE 50 MG/ML
100 INJECTION, SOLUTION INTRAVENOUS PRN
Status: CANCELLED | OUTPATIENT
Start: 2022-06-01

## 2022-06-01 RX ORDER — MAGNESIUM SULFATE 1 G/100ML
1000 INJECTION INTRAVENOUS PRN
Status: CANCELLED | OUTPATIENT
Start: 2022-06-01

## 2022-06-01 RX ORDER — DEXAMETHASONE SODIUM PHOSPHATE 10 MG/ML
10 INJECTION, SOLUTION INTRAMUSCULAR; INTRAVENOUS EVERY 6 HOURS
Status: CANCELLED | OUTPATIENT
Start: 2022-06-01

## 2022-06-01 RX ORDER — ACETAMINOPHEN 650 MG/1
650 SUPPOSITORY RECTAL EVERY 6 HOURS PRN
Status: CANCELLED | OUTPATIENT
Start: 2022-06-01

## 2022-06-01 RX ORDER — SODIUM CHLORIDE 0.9 % (FLUSH) 0.9 %
5-40 SYRINGE (ML) INJECTION EVERY 12 HOURS SCHEDULED
Status: CANCELLED | OUTPATIENT
Start: 2022-06-01

## 2022-06-01 RX ORDER — ENOXAPARIN SODIUM 100 MG/ML
30 INJECTION SUBCUTANEOUS 2 TIMES DAILY
Status: CANCELLED | OUTPATIENT
Start: 2022-06-01

## 2022-06-01 RX ORDER — ONDANSETRON 2 MG/ML
4 INJECTION INTRAMUSCULAR; INTRAVENOUS EVERY 6 HOURS PRN
Status: CANCELLED | OUTPATIENT
Start: 2022-06-01

## 2022-06-01 RX ORDER — SODIUM CHLORIDE 9 MG/ML
INJECTION, SOLUTION INTRAVENOUS PRN
Status: CANCELLED | OUTPATIENT
Start: 2022-06-01

## 2022-06-01 RX ORDER — ONDANSETRON 4 MG/1
4 TABLET, ORALLY DISINTEGRATING ORAL EVERY 8 HOURS PRN
Status: CANCELLED | OUTPATIENT
Start: 2022-06-01

## 2022-06-01 RX ORDER — SODIUM CHLORIDE 0.9 % (FLUSH) 0.9 %
10 SYRINGE (ML) INJECTION PRN
Status: CANCELLED | OUTPATIENT
Start: 2022-06-01

## 2022-06-01 RX ORDER — INSULIN LISPRO 100 [IU]/ML
0-18 INJECTION, SOLUTION INTRAVENOUS; SUBCUTANEOUS
Status: CANCELLED | OUTPATIENT
Start: 2022-06-02

## 2022-06-01 RX ORDER — DEXAMETHASONE SODIUM PHOSPHATE 10 MG/ML
10 INJECTION, SOLUTION INTRAMUSCULAR; INTRAVENOUS EVERY 6 HOURS
Qty: 12 ML | Refills: 0 | Status: ON HOLD
Start: 2022-06-01 | End: 2022-06-03 | Stop reason: HOSPADM

## 2022-06-01 RX ORDER — FLUTICASONE PROPIONATE 110 UG/1
1 AEROSOL, METERED RESPIRATORY (INHALATION) 2 TIMES DAILY
Status: CANCELLED | OUTPATIENT
Start: 2022-06-01

## 2022-06-01 RX ORDER — ALBUTEROL SULFATE 90 UG/1
1 AEROSOL, METERED RESPIRATORY (INHALATION) EVERY 4 HOURS PRN
Status: CANCELLED | OUTPATIENT
Start: 2022-06-01

## 2022-06-01 RX ORDER — POTASSIUM CHLORIDE 20 MEQ/1
40 TABLET, EXTENDED RELEASE ORAL PRN
Status: CANCELLED | OUTPATIENT
Start: 2022-06-01

## 2022-06-01 RX ORDER — SODIUM CHLORIDE 9 MG/ML
INJECTION, SOLUTION INTRAVENOUS CONTINUOUS
Status: CANCELLED | OUTPATIENT
Start: 2022-06-01

## 2022-06-01 RX ORDER — POTASSIUM CHLORIDE 7.45 MG/ML
10 INJECTION INTRAVENOUS PRN
Status: CANCELLED | OUTPATIENT
Start: 2022-06-01

## 2022-06-01 RX ADMIN — DEXAMETHASONE SODIUM PHOSPHATE 10 MG: 10 INJECTION, SOLUTION INTRAMUSCULAR; INTRAVENOUS at 20:38

## 2022-06-01 RX ADMIN — ENOXAPARIN SODIUM 30 MG: 100 INJECTION SUBCUTANEOUS at 20:37

## 2022-06-01 RX ADMIN — SODIUM CHLORIDE 3000 MG: 900 INJECTION INTRAVENOUS at 13:08

## 2022-06-01 RX ADMIN — INSULIN LISPRO 3 UNITS: 100 INJECTION, SOLUTION INTRAVENOUS; SUBCUTANEOUS at 13:09

## 2022-06-01 RX ADMIN — ENOXAPARIN SODIUM 30 MG: 100 INJECTION SUBCUTANEOUS at 08:06

## 2022-06-01 RX ADMIN — SODIUM CHLORIDE 3000 MG: 900 INJECTION INTRAVENOUS at 20:39

## 2022-06-01 RX ADMIN — DEXAMETHASONE SODIUM PHOSPHATE 10 MG: 10 INJECTION, SOLUTION INTRAMUSCULAR; INTRAVENOUS at 06:04

## 2022-06-01 RX ADMIN — DEXAMETHASONE SODIUM PHOSPHATE 10 MG: 10 INJECTION, SOLUTION INTRAMUSCULAR; INTRAVENOUS at 13:08

## 2022-06-01 RX ADMIN — FLUTICASONE PROPIONATE 1 PUFF: 110 AEROSOL, METERED RESPIRATORY (INHALATION) at 07:30

## 2022-06-01 RX ADMIN — SODIUM CHLORIDE: 9 INJECTION, SOLUTION INTRAVENOUS at 05:23

## 2022-06-01 RX ADMIN — DEXAMETHASONE SODIUM PHOSPHATE 10 MG: 10 INJECTION, SOLUTION INTRAMUSCULAR; INTRAVENOUS at 00:55

## 2022-06-01 RX ADMIN — FLUTICASONE PROPIONATE 1 PUFF: 110 AEROSOL, METERED RESPIRATORY (INHALATION) at 20:00

## 2022-06-01 ASSESSMENT — PAIN SCALES - GENERAL: PAINLEVEL_OUTOF10: 0

## 2022-06-01 NOTE — FLOWSHEET NOTE
[facility] NOTE    Room # 2006/2006-02   Name: Isai Chisholm              Reason for visit: Routine    I visited the patient. Admit Date & Time: 5/31/2022 12:41 PM    Assessment:  Isai Chisholm is a 27 y.o. female. Upon entering the room patient states about their medical condition, states struggles with their medical situation. States worries, fears frustrations. Patient states well , treated well. Patient states good family support, shares about spiritual life, Catholic background, shares Catholic beliefs. Patient shares about outside interests. Intervention:   provided a ministry presence, listening and prayer. Outcome:  Patient open to visit. Plan:  Chaplains will remain available to offer spiritual and emotional support as needed. Electronically signed by Chaplain Arturo, on 6/1/2022 at 7:25 PM.  Sumeet       06/01/22 1923   Encounter Summary   Service Provided For: Patient and family together   Referral/Consult From: [facility];Family members   Last Encounter  06/01/22   Complexity of Encounter Moderate   Begin Time 1847   End Time  1857   Total Time Calculated 10 min   Encounter    Type Initial Screen/Assessment   Spiritual/Emotional needs   Type Spiritual Support   Assessment/Intervention/Outcome   Assessment Passive;Coping;Fearful   Intervention Active listening;Discussed belief system/Catholic practices/kvng;Discussed illness injury and its impact;Prayer (assurance of)/[place];Sustaining Presence/Ministry of presence   Outcome Engaged in conversation;Expressed feelings, needs, and concerns;Expressed Gratitude;Receptive

## 2022-06-01 NOTE — PROGRESS NOTES
Physician Progress Note      PATIENTScarfalguni Slade  [facility] #:                  [id number]  :                       1995  ADMIT DATE:       2022 12:41 PM  [address] [place] DATE:  RESPONDING  PROVIDER #:        Micki Rock MD          QUERY TEXT:    Pt admitted with peritonsillar abscess. Pt noted to have elevated WBC,   tachycardia. If possible, please document in the progress notes and discharge   summary if you are evaluating and /or treating any of the following: The medical record reflects the following:  Risk Factors: peritonsillar abscess  Clinical Indicators: Tmax 37.2, tachy up to 120. WBC 15.3->21.9 . CT soft   tissue neck developing abscess - Extensive pharyngitis/tonsillitis with patchy   areas of low attenuation concerning for early tonsillar/peritonsillar abscess   formation  Treatment: ID consulted - IVF @ 125 reduced to 50, Ancef x 2 doses, Unasyn Q6m   Decadron 10 mg Q6  Options provided:  -- Sepsis, present on admission  -- Sepsis was ruled out  -- Other - I will add my own diagnosis  -- Disagree - Not applicable / Not valid  -- Disagree - Clinically unable to determine / Unknown  -- Refer to Clinical Documentation Reviewer    PROVIDER RESPONSE TEXT:    sepsis which was present on admission.     Query created by: Nga Street on 2022 1:09 PM      Electronically signed by:  Micki Rock MD 2022 5:05 PM

## 2022-06-01 NOTE — RT PROTOCOL NOTE
RT Inhaler-Nebulizer Bronchodilator Protocol Note    There is a bronchodilator order in the chart from a provider indicating to follow the RT Bronchodilator Protocol and there is an Initiate RT Inhaler-Nebulizer Bronchodilator Protocol order as well (see protocol at bottom of note). CXR Findings:  No results found. The findings from the last RT Protocol Assessment were as follows:   History Pulmonary Disease: None or smoker <15 pack years  Respiratory Pattern: Regular pattern and RR 12-20 bpm  Breath Sounds: Clear breath sounds  Cough: Strong, spontaneous, non-productive  Indication for Bronchodilator Therapy: None  Bronchodilator Assessment Score: 0    Aerosolized bronchodilator medication orders have been revised according to the RT Inhaler-Nebulizer Bronchodilator Protocol below. Respiratory Therapist to perform RT Therapy Protocol Assessment initially then follow the protocol. Repeat RT Therapy Protocol Assessment PRN for score 0-3 or on second treatment, BID, and PRN for scores above 3. No Indications - adjust the frequency to every 6 hours PRN wheezing or bronchospasm, if no treatments needed after 48 hours then discontinue using Per Protocol order mode. If indication present, adjust the RT bronchodilator orders based on the Bronchodilator Assessment Score as indicated below. Use Inhaler orders unless patient has one or more of the following: on home nebulizer, not able to hold breath for 10 seconds, is not alert and oriented, cannot activate and use MDI correctly, or respiratory rate 25 breaths per minute or more, then use the equivalent nebulizer order(s) with same Frequency and PRN reasons based on the score. If a patient is on this medication at home then do not decrease Frequency below that used at home.     0-3 - enter or revise RT bronchodilator order(s) to equivalent RT Bronchodilator order with Frequency of every 4 hours PRN for wheezing or increased work of breathing using Per Protocol order mode. 4-6 - enter or revise RT Bronchodilator order(s) to two equivalent RT bronchodilator orders with one order with BID Frequency and one order with Frequency of every 4 hours PRN wheezing or increased work of breathing using Per Protocol order mode. 7-10 - enter or revise RT Bronchodilator order(s) to two equivalent RT bronchodilator orders with one order with TID Frequency and one order with Frequency of every 4 hours PRN wheezing or increased work of breathing using Per Protocol order mode. 11-13 - enter or revise RT Bronchodilator order(s) to one equivalent RT bronchodilator order with QID Frequency and an Albuterol order with Frequency of every 4 hours PRN wheezing or increased work of breathing using Per Protocol order mode. Greater than 13 - enter or revise RT Bronchodilator order(s) to one equivalent RT bronchodilator order with every 4 hours Frequency and an Albuterol order with Frequency of every 2 hours PRN wheezing or increased work of breathing using Per Protocol order mode. RT to enter RT Home Evaluation for COPD & MDI Assessment order using Per Protocol order mode.     Electronically signed by Evgeny Purdy RCP on 5/31/2022 at 9:31 PM

## 2022-06-01 NOTE — DISCHARGE SUMMARY
[facility]    Adult Hospitalist      Patient ID: Abbie Nye  MRN: [id number]     Acct:  [de-identified]       Patient's PCP: Haleigh Tipton MD    Admit Date: 5/31/2022     Discharge Date:   2022    Admitting Physician: Fredrick Alamo MD    Discharge Physician: Aye Smith MD     CONSULTANTS: Patient Care Team:  Haleigh Tipton MD as PCP - General (Family Medicine)        Active Discharge Diagnoses:  · Tonsillitis  · Peritonsillar/tonsillar abscess  · Diabetes type 2  · Asthma  · Obesity  ·       Hospital Course:     26-year-old lady with past medical history of asthma, diabetes presented to ER complaining of sore throat. Said the symptoms are going on for 1 day. Patient denies any cough, fever, congestion, shortness of breath. However she is complaining of throat pain and rated as 10/10, sharp, severe. She is also complaining of headache, fatigue, chills. On presentation WBC was 15.3. Patient was maintaining airway and was able to swallow saliva. CT neck was significant for extensive pharyngitis/tonsillitis with patchy areas of low-attenuation  tonsillar/peritonsillar abscess formation on the right with intervening areas of soft tissue suggesting phlegmonous components. , conglomerate  measurement of 2X 2 cm. Patient admitted for further management. Patient was empirically treated with IV Unasyn. Blood culture report was negative. Throat culture was also ordered. ID was involved in the care. ENT was consulted, however they do not round at [facility].  I reached out to [id number]  who accepted the patient for transfer. Patient will be transferred to [place] for evaluation by ENT and possible drainage of abscess if needed. At the time of discharge patient was hemodynamically stable and asymptomatic.         The plan was discussed in detail with patient who agreed with the plan and verbalized understanding .    The patient was seen and examined on day of discharge and this discharge summary is in conjunction with any daily progress note from day of discharge. Hospital Data:    Labs:    Hematology:  Recent Labs     05/31/22  1320 06/01/22  0552   WBC 15.3* 21.9*   RBC 5.74* 4.62   HGB 14.8 12.0   HCT 47.2* 37.7   MCV 82.2* 81.6*   MCH 25.8 26.0   MCHC 31.4 31.8   RDW 16.2* 15.1*    380   MPV 8.6 8.6   INR  --  1.2     Chemistry:  Recent Labs     05/31/22  1320 05/31/22  1759 06/01/22  0552     --  137   K 3.9  --  3.9     --  103   CO2 26  --  21   GLUCOSE 106* 120 148*   BUN 7  --  9   CREATININE 0.93*  --  0.83   ANIONGAP 11  --  13   LABGLOM >60  --  >60   GFRAA >60  --  >60   CALCIUM 9.2  --  9.5     Recent Labs     05/31/22  1320 05/31/22  1753 05/31/22  2111 06/01/22  0610 06/01/22  1139 06/01/22  1608   LABA1C 6.0  --   --   --   --   --    POCGLU  --  120* 149* 135* 151* 103     Lab Results   Component Value Date    INR 1.2 06/01/2022    PROTIME 15.1 (H) 06/01/2022     Lab Results   Component Value Date/Time    SPECIAL LT AC, 2 ML 05/31/2022 07:28 PM     Lab Results   Component Value Date/Time    CULTURE NO GROWTH 12 HOURS 05/31/2022 07:28 PM       No results found for: POCPH, PHART, PH, POCPCO2, OKN6QIS, PCO2, POCPO2, PO2ART, PO2, POCHCO3, RVB2MIQ, HCO3, NBEA, PBEA, BEART, BE, THGBART, THB, AKT6CWH, MAXP0VKR, D6HXRSIW, O2SAT, FIO2    Radiology:    CT SOFT TISSUE NECK W CONTRAST    Result Date: 5/31/2022  1. Extensive pharyngitis/tonsillitis with patchy areas of low attenuation concerning for early tonsillar/peritonsillar abscess formation on the right with intervening areas of soft tissue suggesting phlegmonous components with conglomerate measurement of 2 x 2 cm in transaxial dimensions. 2. Mild right-sided cervical lymphadenopathy, likely reactive.          All radiological studies reviewed      Reviews of Symptoms:    A 10 point system is reviewed and  negative except described in ALERT and TOLERATING PO PRN    HYPOGLYCEMIA TREATMENT: blood glucose less than 70 mg/dL and patient ALERT and TOLERATING PO PRN    HYPOGLYCEMIA TREATMENT: blood glucose less than 70 mg/dL and patient NOT ALERT or NPO PRN    POCT Glucose PRN    Initiate RT Inhaler-Nebulizer Bronchodilator Protocol DAILY (RT)          Diet: ADULT DIET; Easy to Chew    Discharge Medications:      Medication List      ASK your doctor about these medications    Flovent  MCG/ACT inhaler  Generic drug: fluticasone  INHALE TWO PUFFS TWICE DAILY     ProAir  (90 Base) MCG/ACT inhaler  Generic drug: albuterol sulfate HFA  INHALE ONE TO TWO PUFFS FOUR TIMES DAILY            Code Status:  Full Code    Time Spent on discharge is  35 mins in patient examination, evaluation, counseling as well as medication reconciliation, prescriptions for required medications, discharge plan and follow up. Electronically signed by Kimberli Pearl MD on 6/1/2022 at 5:10 PM     Thank you Dr. Bijan Tanner MD for the opportunity to be involved in this patient's care. This note was created with the assistance of a speech-recognition program.  Although the intention is to generate a document that actually reflects the content of the visit, no guarantees can be provided that every mistake has been identified and corrected by editing. Note was updated later by me after  physical examination and  completion of the assessment.

## 2022-06-01 NOTE — PROGRESS NOTES
Occupational Therapy   [facility]  Occupational Therapy Not Seen Note    Patient not available for Occupational Therapy due to:    [] Testing:    [] Hemodialysis    [] Cancelled by RN:    []Refusal by Patient:    [] Surgery:     [] Intubation:     [] Pain Medication:    [] Sedation:     [] Spine Precautions :    [] Medical Instability:    [x] Other: D/C OT, pt is independent    Rafa Alvarado OTR/L

## 2022-06-01 NOTE — CARE COORDINATION
Case Management Initial Discharge Plan  Meche Nichols,             Met with:patient and sister to discuss discharge plans. Information verified: address, contacts, phone number, , insurance Yes  PCP: Meseret Crespo MD  Date of last visit:     Insurance Provider: [address]    Discharge Planning    Living Arrangements:  [address] [place] [address] [place]:  [address] Members,Friends/Neighbors    Home has 1 story townhouse  0  stairs to climb to get into front door, 0 stairs to climb to reach second floor  Location of bedroom/bathroom in home  - main floor    Patient able to perform ADL's:Independent    Current Services (outpatient & in home) none  DME equipment: nebulizer  DME provider: N/A    [facility]: [facility]    Potential Assistance Needed:  Transfer to [facility] for ENT consult    Patient agreeable to home care: No  [place] of choice provided:  n/a    Prior SNF/Rehab Placement and Facility: No  Agreeable to SNF/Rehab: No  [place] of choice provided: n/a   Evaluation: n/a    Expected Discharge date:     Patient expects to be transferred to:   [facility]  Follow Up Appointment: Best Day/ Time: Monday AM    Transportation provider: family  Transportation arrangements needed for discharge: No    Readmission Risk              Risk of Unplanned Readmission:  6             Does patient have a readmission risk score greater than 14?: No  If yes, follow-up appointment must be made within 7 days of discharge. Goal of Care:       Discharge Plan: Met with patient and sister at bedside. Lives with daughter and is independent. Admitted for tonsillar abscess and ID consulted. ID rounded and changed IV ATB to Unasyn. Pt needs to be transferred to [facility] for ENT consult. Lead nurse spoke to [facility] and beds are currently full at [facility]. Awaiting bed availability for transfer.        Electronically signed by Bandar Mix RN on 22 at 12:30 PM EDT

## 2022-06-01 NOTE — PROGRESS NOTES
Physical Therapy        Physical Therapy Cancel Note      DATE: 2022    NAME: Ita Samaniego  MRN: [id number]   : 1995      Patient not seen this date for Physical Therapy due to:    Pt. States she has no PT needs at this time.   Will remove from list.       Electronically signed by Taras Abarca PT on 2022 at 1:35 PM

## 2022-06-01 NOTE — PLAN OF CARE
Problem: Discharge Planning  Goal: Discharge to home or other facility with appropriate resources  Outcome: Progressing  Flowsheets (Taken 6/1/2022 0806)  Discharge to home or other facility with appropriate resources: Identify barriers to discharge with patient and caregiver     Problem: Pain  Goal: Verbalizes/displays adequate comfort level or baseline comfort level  Outcome: Progressing     Problem: Safety - Adult  Goal: Free from fall injury  Outcome: Progressing     Problem: Chronic Conditions and Co-morbidities  Goal: Patient's chronic conditions and co-morbidity symptoms are monitored and maintained or improved  Outcome: Progressing  Flowsheets (Taken 6/1/2022 0806)  Care Plan - Patient's Chronic Conditions and Co-Morbidity Symptoms are Monitored and Maintained or Improved: Monitor and assess patient's chronic conditions and comorbid symptoms for stability, deterioration, or improvement

## 2022-06-01 NOTE — PLAN OF CARE
Problem: Discharge Planning  Goal: Discharge to home or other facility with appropriate resources  Outcome: Progressing  Flowsheets  Taken 5/31/2022 2038  Discharge to home or other facility with appropriate resources:   Identify barriers to discharge with patient and caregiver   Arrange for needed discharge resources and transportation as appropriate   Identify discharge learning needs (meds, wound care, etc)  Taken 5/31/2022 2030  Discharge to home or other facility with appropriate resources:   Identify barriers to discharge with patient and caregiver   Arrange for needed discharge resources and transportation as appropriate   Identify discharge learning needs (meds, wound care, etc)     Problem: Pain  Goal: Verbalizes/displays adequate comfort level or baseline comfort level  Outcome: Progressing  Flowsheets (Taken 6/1/2022 0057)  Verbalizes/displays adequate comfort level or baseline comfort level:   Encourage patient to monitor pain and request assistance   Assess pain using appropriate pain scale   Administer analgesics based on type and severity of pain and evaluate response   Implement non-pharmacological measures as appropriate and evaluate response     Problem: Safety - Adult  Goal: Free from fall injury  Outcome: Progressing  Flowsheets (Taken 6/1/2022 0057)  Free From Fall Injury: Instruct family/caregiver on patient safety

## 2022-06-01 NOTE — PROGRESS NOTES
Progress note  [facility],    Adult Hospitalist      Name: Gustabo Ortega  MRN: [id number]     [place]: [de-identified]  Room: 2006/2006-02    Admit Date: 5/31/2022 12:41 PM  PCP: Kiara Ervin MD    Primary Problem  Principal Problem: Tonsillitis  Resolved Problems:    * No resolved hospital problems. *        Assesment:     · Tonsillitis  · Peritonsillar/tonsillar abscess  · Diabetes type 2  · Asthma  · Obesity        Plan:     · Admitted to [place] [address] with telemetry  · O2 maintain oxygen saturation greater than 92%  ·   · Monitor airway  · Blood culture  · Throat culture  · Pain control  · IV Decadron  · Empiric antibiotics as below, started on Unasyn  · ENT consult for possible abscess drainage, ENT is available only at [facility] so arrangements will be made to transfer the patient to [facility].  · ID consult  ·   ·   · Continue to monitor/telemetry/CBC with differential daily/BMP daily  · DVT and GI prophylaxis.   Continue medications as below      Scheduled Meds:   ampicillin-sulbactam  3,000 mg IntraVENous Q6H    fluticasone  1 puff Inhalation BID    sodium chloride flush  5-40 mL IntraVENous 2 times per day    enoxaparin  30 mg SubCUTAneous BID    dexamethasone  10 mg IntraVENous Q6H    insulin lispro  0-18 Units SubCUTAneous TID WC    insulin lispro  0-9 Units SubCUTAneous Nightly     Continuous Infusions:   sodium chloride 50 mL/hr at 06/01/22 1644    sodium chloride      dextrose       PRN Meds:  albuterol sulfate HFA, 1 puff, Q4H PRN  sodium chloride flush, 10 mL, PRN  sodium chloride, , PRN  potassium chloride, 40 mEq, PRN   Or  potassium alternative oral replacement, 40 mEq, PRN   Or  potassium chloride, 10 mEq, PRN  magnesium sulfate, 1,000 mg, PRN  ondansetron, 4 mg, Q8H PRN   Or  ondansetron, 4 mg, Q6H PRN  magnesium hydroxide, 30 mL, Daily PRN  acetaminophen, 650 mg, Q6H PRN   Or  acetaminophen, 650 mg, Q6H PRN  morphine, 2 mg, Q4H PRN  glucose, 4 tablet, PRN  dextrose bolus, 125 mL, PRN   Or  dextrose bolus, 250 mL, PRN  glucagon (rDNA), 1 mg, PRN  dextrose, 100 mL/hr, PRN        Chief Complaint:     Chief Complaint   Patient presents with    Pharyngitis     Since yesterday; hx of strep          History of Present Illness:     Patient seen and examined at the bedside. Patient still having significant sore throat and pain. Slightly improved. Still able to swallow her saliva. Afebrile  Patient denies any chest pain, shortness of breath, palpitation, changes in urination, bowel habit or rash. HPI:       Anneliese Steinberg is a 27 y.o.  female who presents with Pharyngitis (Since yesterday; hx of strep )    18-year-old lady with past medical history of asthma, diabetes presented to ER complaining of sore throat. Said the symptoms are going on for 1 day. Patient denies any cough, fever, congestion, shortness of breath. However she is complaining of throat pain and rated as 10/10, sharp, severe. She is also complaining of headache, fatigue, chills. On presentation WBC was 15.3. Patient was maintaining airway and was able to swallow saliva. CT neck was significant for extensive pharyngitis/tonsillitis with patchy areas of low-attenuation  tonsillar/peritonsillar abscess formation on the right with intervening areas of soft tissue suggesting phlegmonous components. , conglomerate  measurement of 2X 2 cm. I have personally reviewed the past medical history, past surgical history, medications, social history, and family history, and summarized in the note. Review of Systems:     All 10 point system is reviewed and negative otherwise mentioned in HPI. Past Medical History:     Past Medical History:   Diagnosis Date    Asthma     DM (diabetes mellitus), type 2 ([facility] [address] 8/12/2013    Headache(784.0)     Obesity         Past Surgical History:     No past surgical history on file.      Medications Prior to Admission:       Prior to Admission medications    Medication Sig Start Date End Date Taking? Authorizing Provider   FLOVENT  MCG/ACT inhaler INHALE TWO PUFFS TWICE DAILY 13   Adrian Ramos MD   PROAIR  (90 BASE) MCG/ACT inhaler INHALE ONE TO TWO PUFFS FOUR TIMES DAILY 13   Adrian Ramos MD        Allergies:       Patient has no known allergies. Social History:     Tobacco:    reports that she has never smoked. She has never used smokeless tobacco.  Alcohol:      has no history on file for alcohol use. Drug Use:  has no history on file for drug use. Family History:     No family history on file. Physical Exam:     Vitals:  /71   Pulse 85   Temp 97.5 °F (36.4 °C) (Oral)   Resp 18   Ht 5' 5\" (1.651 m)   Wt (!) 307 lb 1 oz (139.3 kg)   SpO2 96% Comment: Simultaneous filing. User may not have seen previous data.   BMI 51.10 kg/m²   Temp (24hrs), Av °F (36.7 °C), Min:97.5 °F (36.4 °C), Max:98.6 °F (37 °C)          General appearance - alert, well appearing, and in no acute distress  Mental status - oriented to person, place, and time with normal affect  Head - normocephalic and atraumatic  Eyes - pupils equal and reactive, extraocular eye movements intact, conjunctiva clear  Ears - hearing appears to be intact  Nose - no drainage noted  Mouth -bilateral tonsillar enlargement  Neck -cervical lymphadenopathy  Chest - clear to auscultation, normal effort  Heart - normal rate, regular rhythm, no murmur  Abdomen - soft, nontender, nondistended, bowel sounds present all four quadrants, no masses, hepatomegaly or splenomegaly  Neurological - normal speech, no focal findings or movement disorder noted, cranial nerves II through XII grossly intact  Extremities - peripheral pulses palpable, no pedal edema or calf pain with palpation  Skin - no gross lesions, rashes, or induration noted        Data:     Labs:    Hematology:  Recent Labs     22  1320 22  0552   WBC 15.3* 21.9*   RBC 5.74* 4.62 HGB 14.8 12.0   HCT 47.2* 37.7   MCV 82.2* 81.6*   MCH 25.8 26.0   MCHC 31.4 31.8   RDW 16.2* 15.1*    380   MPV 8.6 8.6   INR  --  1.2     Chemistry:  Recent Labs     05/31/22  1320 05/31/22  1759 06/01/22  0552     --  137   K 3.9  --  3.9     --  103   CO2 26  --  21   GLUCOSE 106* 120 148*   BUN 7  --  9   CREATININE 0.93*  --  0.83   ANIONGAP 11  --  13   LABGLOM >60  --  >60   GFRAA >60  --  >60   CALCIUM 9.2  --  9.5     Recent Labs     05/31/22  1320 05/31/22  1753 05/31/22  2111 06/01/22  0610 06/01/22  1139 06/01/22  1608   LABA1C 6.0  --   --   --   --   --    POCGLU  --  120* 149* 135* 151* 103       Lab Results   Component Value Date    INR 1.2 06/01/2022    PROTIME 15.1 (H) 06/01/2022       Lab Results   Component Value Date/Time    SPECIAL LT AC, 2 ML 05/31/2022 07:28 PM     Lab Results   Component Value Date/Time    CULTURE NO GROWTH 12 HOURS 05/31/2022 07:28 PM       No results found for: POCPH, PHART, PH, POCPCO2, BOO8SWI, PCO2, POCPO2, PO2ART, PO2, POCHCO3, TFT9GSI, HCO3, NBEA, PBEA, BEART, BE, THGBART, THB, FHE3SMT, TGNQ8PBE, I6PSDVZL, O2SAT, FIO2    Radiology:    CT SOFT TISSUE NECK W CONTRAST    Result Date: 5/31/2022  1. Extensive pharyngitis/tonsillitis with patchy areas of low attenuation concerning for early tonsillar/peritonsillar abscess formation on the right with intervening areas of soft tissue suggesting phlegmonous components with conglomerate measurement of 2 x 2 cm in transaxial dimensions. 2. Mild right-sided cervical lymphadenopathy, likely reactive.          All radiological studies reviewed                Code Status:  Full Code    Electronically signed by Chelo Beverly MD on 6/1/2022 at 5:06 PM     Copy sent to Dr. Marilia Mendez MD    This note was created with the assistance of a speech-recognition program.  Although the intention is to generate a document that actually reflects the content of the visit, no guarantees can be provided that every mistake has been identified and corrected by editing. Note was updated later by me after  physical examination and  completion of the assessment.

## 2022-06-01 NOTE — CONSULTS
[facility]  Initial Consult Note  Date: 6/1/2022    Hospital day :1     Impression:   · Acute tonsillitis/pharyngitis with concern for early tonsillar/peritonsillar abscess formation on the right  · Diabetes mellitus type 2  · Obesity    Recommendations   · I will transition patient to Unasyn  · Patient should be seen and evaluated by an ENT physician, they do not round at [facility], I have recommended for patient to be transferred to [facility] to be evaluated by ENT  · Blood cultures remain no growth to date  · Throat culture has been obtained  · We will continue supportive care    Chief complaint/reason for consultation:   Tonsillitis     History of Present Illness:   Rusty Mirza is a 27y.o.-year-old female who was initially admitted on 5/31/2022. Patient has known medical history of diabetes mellitus, obesity, asthma. She started to have a sore throat on Monday and slept most of the day, when she woke up later that day she started having some difficulty swallowing and a headache. By the time she woke up Tuesday she continued to have a sore throat, difficulty swallowing and headache. She also noticed some shortness of breath but only with heavy activity. She did not have any associated fevers or chills. No body aches, just fatigue. Due to really not feeling well she decided to come to the emergency department. She was diagnosed with extensive tonsillitis/peritonsillitis with concern for early abscess formation on the right. She was initiated on cefazolin and we were consulted as well as ENT. I have personally reviewed the past medical history, past surgical history, medications, social history, and family history, and I have updated the database accordingly.   Past Medical History:     Past Medical History:   Diagnosis Date    Asthma     DM (diabetes mellitus), type 2 ([facility] [address] 8/12/2013    Headache(784.0)     Obesity      Past Surgical  History:   No past surgical history on file. Medications:      fluticasone  1 puff Inhalation BID    sodium chloride flush  5-40 mL IntraVENous 2 times per day    enoxaparin  30 mg SubCUTAneous BID    ceFAZolin  2,000 mg IntraVENous Q8H    dexamethasone  10 mg IntraVENous Q6H    insulin lispro  0-18 Units SubCUTAneous TID WC    insulin lispro  0-9 Units SubCUTAneous Nightly     Social History:     Social History     Socioeconomic History    Marital status: Single     Spouse name: Not on file    Number of children: Not on file    Years of education: Not on file    Highest education level: Not on file   Occupational History    Not on file   Tobacco Use    Smoking status: Never Smoker    Smokeless tobacco: Never Used   Substance and Sexual Activity    Alcohol use: Not on file    Drug use: Not on file    Sexual activity: Not on file   Other Topics Concern    Not on file   Social History Narrative    Not on file     Social Determinants of Health     Financial Resource Strain:     Difficulty of Paying Living Expenses: Not on file   Food Insecurity:     Worried About Running Out of Food in the Last Year: Not on file    Ida of Food in the Last Year: Not on file   Transportation Needs:     Lack of Transportation (Medical): Not on file    Lack of Transportation (Non-Medical):  Not on file   Physical Activity:     Days of Exercise per Week: Not on file    Minutes of Exercise per Session: Not on file   Stress:     Feeling of Stress : Not on file   Social Connections:     Frequency of Communication with Friends and Family: Not on file    Frequency of Social Gatherings with Friends and Family: Not on file    Attends [employer] Services: Not on file    Active Member of Clubs or Organizations: Not on file    Attends Club or Organization Meetings: Not on file    Marital Status: Not on file   Intimate Partner Violence:     Fear of Current or Ex-Partner: Not on file    Emotionally Abused: Not on file    Physically Abused: Not on file    Sexually Abused: Not on file   Housing Stability:     Unable to Pay for Housing in the Last Year: Not on file    Number of Places Lived in the Last Year: Not on file    Unstable Housing in the Last Year: Not on file     Family History:   No family history on file. Allergies:   Patient has no known allergies. Review of Systems:   General: No fevers or chills. Eyes: No double vision or blurry vision. ENT: Sore throat. Difficulty swallowing. Cardiovascular: No chest pain or palpitations. Lung: No shortness of breath or cough. Abdomen: No nausea, vomiting, diarrhea, or abdominal pain. Genitourinary: No increased urinary frequency, or dysuria. Musculoskeletal: No muscle aches or pains. Hematologic: No bleeding or bruising. Neurologic: No headache, weakness, numbness, or tingling. Physical Examination :   /71   Pulse 85   Temp 97.5 °F (36.4 °C) (Oral)   Resp 18   Ht 5' 5\" (1.651 m)   Wt (!) 307 lb 1 oz (139.3 kg)   SpO2 96% Comment: Simultaneous filing. User may not have seen previous data. BMI 51.10 kg/m²     Temperature Range: Temp: 97.5 °F (36.4 °C) Temp  Av.2 °F (36.8 °C)  Min: 97.5 °F (36.4 °C)  Max: 99 °F (37.2 °C)  General Appearance: Awake, alert, and in no apparent distress  Head: Normocephalic, without obvious abnormality, atraumatic  Eyes: Pupils equal, round, reactive, to light and accommodation; extraocular movements intact; sclera anicteric; conjunctivae pink  ENT: tonsils erythematous and swollen with exudates   Neck: Supple. Right-sided lymphadenopathy  Pulmonary/Chest: Clear to auscultation, without wheezes, rales, or rhonchi  Cardiovascular: Regular rate and rhythm without murmurs, rubs, or gallops. Abdomen: Soft, nontender, nondistended. Extremities: No cyanosis, clubbing, edema, or effusions. Neurologic: No gross sensory or motor deficits. Skin: Warm and dry with no rash.     Medical Decision Making:   I have independently reviewed/ordered the following labs:  CBC with Differential:   Recent Labs     05/31/22  1320 06/01/22  0552   WBC 15.3* 21.9*   HGB 14.8 12.0   HCT 47.2* 37.7    380   LYMPHOPCT 18* 9*   MONOPCT 7 1*     BMP:   Recent Labs     05/31/22  1320 06/01/22  0552    137   K 3.9 3.9    103   CO2 26 21   BUN 7 9   CREATININE 0.93* 0.83     Hepatic Function Panel: No results for input(s): PROT, LABALBU, BILIDIR, IBILI, BILITOT, ALKPHOS, ALT, AST in the last 72 hours. Lab Results   Component Value Date    PROCAL 0.15 05/31/2022       No results found for: CRP  No results found for: FERRITIN  No results found for: FIBRINOGEN  No results found for: DDIMER  No results found for: LDH    No results found for: SEDRATE    No results found for: COVID19  No results found for requested labs within last 30 days. Imaging Studies:   CT SOFT TISSUE NECK W CONTRAST    Result Date: 5/31/2022    1. Extensive pharyngitis/tonsillitis with patchy areas of low attenuation concerning for early tonsillar/peritonsillar abscess formation on the right with intervening areas of soft tissue suggesting phlegmonous components with conglomerate measurement of 2 x 2 cm in transaxial dimensions. 2. Mild right-sided cervical lymphadenopathy, likely reactive. Cultures:     Culture, Throat [[id number]] Collected: 06/01/22 0610   Order Status: Sent Specimen: Throat Updated: 06/01/22 7825   Culture, Blood 1 [[id number]] Collected: 05/31/22 1928   Order Status: Completed Specimen: Blood Updated: 05/31/22 2211    Specimen Description . BLOOD    Special Requests LT AC, 2 ML    Culture NO GROWTH <24 HRS   Culture, Blood 1 [[id number]] Collected: 05/31/22 1918   Order Status: Completed Specimen: Blood Updated: 05/31/22 2211    Specimen Description . BLOOD    Special Requests RT FA, 12 ML    Culture NO GROWTH <24 HRS   Strep Gr A Direct Ag [[id number]] Collected: 05/31/22 1313   Order Status: Completed Specimen: Throat Updated: 05/31/22 1401 [facility] . THROAT SWAB    Strep A Ag NEGATIVE           Thank you for allowing us to participate in the care of this patient. Please call with questions. Electronically signed by Dillon Apgar, APRN - CNP on 2022 at 8:38 AM      [facility]  Dillon Apgar, [address] messaging  OFFICE: [phone]      This note is created with the assistance of a speech recognition program.  While intending to generate a document that actually reflects the content of the visit, the document can still have some errors including those of syntax and sound a like substitutions which may escape proof reading. In such instances, actual meaning can be extrapolated by contextual diversion.

## 2022-06-02 ENCOUNTER — HOSPITAL ENCOUNTER (OUTPATIENT)
Age: 27
Setting detail: OBSERVATION
Discharge: HOME | End: 2022-06-03
Attending: INTERNAL MEDICINE | Admitting: FAMILY MEDICINE
Payer: COMMERCIAL

## 2022-06-02 VITALS
TEMPERATURE: 98.2 F | BODY MASS INDEX: 48.82 KG/M2 | OXYGEN SATURATION: 97 % | DIASTOLIC BLOOD PRESSURE: 70 MMHG | WEIGHT: 293 LBS | RESPIRATION RATE: 18 BRPM | SYSTOLIC BLOOD PRESSURE: 142 MMHG | HEART RATE: 71 BPM | HEIGHT: 65 IN

## 2022-06-02 PROBLEM — J36 PERITONSILLAR ABSCESS: Status: ACTIVE | Noted: 2022-06-02

## 2022-06-02 PROBLEM — J03.90 ACUTE TONSILLITIS: Status: ACTIVE | Noted: 2022-06-02

## 2022-06-02 LAB
ABSOLUTE EOS #: 0 K/UL (ref 0–0.44)
ABSOLUTE IMMATURE GRANULOCYTE: 0 K/UL (ref 0–0.3)
ABSOLUTE LYMPH #: 2.04 K/UL (ref 1.1–3.7)
ABSOLUTE MONO #: 0.77 K/UL (ref 0.1–1.2)
ANION GAP SERPL CALCULATED.3IONS-SCNC: 9 MMOL/L (ref 9–17)
BASOPHILS # BLD: 0 % (ref 0–2)
BASOPHILS ABSOLUTE: 0 K/UL (ref 0–0.2)
BUN BLDV-MCNC: 12 MG/DL (ref 6–20)
BUN/CREAT BLD: 13 (ref 9–20)
CALCIUM SERPL-MCNC: 9.2 MG/DL (ref 8.6–10.4)
CHLORIDE BLD-SCNC: 105 MMOL/L (ref 98–107)
CO2: 26 MMOL/L (ref 20–31)
CREAT SERPL-MCNC: 0.92 MG/DL (ref 0.5–0.9)
EOSINOPHILS RELATIVE PERCENT: 0 % (ref 1–4)
GFR AFRICAN AMERICAN: >60 ML/MIN
GFR NON-AFRICAN AMERICAN: >60 ML/MIN
GFR SERPL CREATININE-BSD FRML MDRD: ABNORMAL ML/MIN/{1.73_M2}
GLUCOSE BLD-MCNC: 116 MG/DL (ref 65–105)
GLUCOSE BLD-MCNC: 129 MG/DL (ref 65–105)
GLUCOSE BLD-MCNC: 138 MG/DL (ref 70–99)
GLUCOSE BLD-MCNC: 186 MG/DL (ref 65–105)
HCT VFR BLD CALC: 36.1 % (ref 36.3–47.1)
HEMOGLOBIN: 11.5 G/DL (ref 11.9–15.1)
IMMATURE GRANULOCYTES: 0 %
LYMPHOCYTES # BLD: 8 % (ref 24–43)
MCH RBC QN AUTO: 26.3 PG (ref 25.2–33.5)
MCHC RBC AUTO-ENTMCNC: 31.9 G/DL (ref 28.4–34.8)
MCV RBC AUTO: 82.4 FL (ref 82.6–102.9)
MONOCYTES # BLD: 3 % (ref 3–12)
PDW BLD-RTO: 15.3 % (ref 11.8–14.4)
PLATELET # BLD: 391 K/UL (ref 138–453)
PMV BLD AUTO: 8.7 FL (ref 8.1–13.5)
POTASSIUM SERPL-SCNC: 4.4 MMOL/L (ref 3.7–5.3)
RBC # BLD: 4.38 M/UL (ref 3.95–5.11)
SEG NEUTROPHILS: 89 % (ref 36–65)
SEGMENTED NEUTROPHILS ABSOLUTE COUNT: 22.69 K/UL (ref 1.5–8.1)
SODIUM BLD-SCNC: 140 MMOL/L (ref 135–144)
WBC # BLD: 25.5 K/UL (ref 3.5–11.3)

## 2022-06-02 PROCEDURE — 82947 ASSAY GLUCOSE BLOOD QUANT: CPT

## 2022-06-02 PROCEDURE — 99219 PR INITIAL OBSERVATION CARE/DAY 50 MINUTES: CPT | Performed by: FAMILY MEDICINE

## 2022-06-02 PROCEDURE — 2580000003 HC RX 258: Performed by: FAMILY MEDICINE

## 2022-06-02 PROCEDURE — 96365 THER/PROPH/DIAG IV INF INIT: CPT

## 2022-06-02 PROCEDURE — G0379 DIRECT REFER HOSPITAL OBSERV: HCPCS

## 2022-06-02 PROCEDURE — 85025 COMPLETE CBC W/AUTO DIFF WBC: CPT

## 2022-06-02 PROCEDURE — 94761 N-INVAS EAR/PLS OXIMETRY MLT: CPT

## 2022-06-02 PROCEDURE — 6360000002 HC RX W HCPCS: Performed by: FAMILY MEDICINE

## 2022-06-02 PROCEDURE — 96375 TX/PRO/DX INJ NEW DRUG ADDON: CPT

## 2022-06-02 PROCEDURE — 36415 COLL VENOUS BLD VENIPUNCTURE: CPT

## 2022-06-02 PROCEDURE — 2580000003 HC RX 258: Performed by: NURSE PRACTITIONER

## 2022-06-02 PROCEDURE — 6360000002 HC RX W HCPCS: Performed by: INTERNAL MEDICINE

## 2022-06-02 PROCEDURE — G0378 HOSPITAL OBSERVATION PER HR: HCPCS

## 2022-06-02 PROCEDURE — 80048 BASIC METABOLIC PNL TOTAL CA: CPT

## 2022-06-02 PROCEDURE — 94640 AIRWAY INHALATION TREATMENT: CPT

## 2022-06-02 PROCEDURE — 6360000002 HC RX W HCPCS: Performed by: NURSE PRACTITIONER

## 2022-06-02 PROCEDURE — 99253 IP/OBS CNSLTJ NEW/EST LOW 45: CPT | Performed by: OTOLARYNGOLOGY

## 2022-06-02 PROCEDURE — 2580000003 HC RX 258: Performed by: INTERNAL MEDICINE

## 2022-06-02 RX ORDER — ALBUTEROL SULFATE 90 UG/1
2 AEROSOL, METERED RESPIRATORY (INHALATION) EVERY 6 HOURS PRN
Status: DISCONTINUED | OUTPATIENT
Start: 2022-06-02 | End: 2022-06-03 | Stop reason: HOSPADM

## 2022-06-02 RX ORDER — GLUCAGON 1 MG/ML
1 KIT INJECTION PRN
Status: DISCONTINUED | OUTPATIENT
Start: 2022-06-02 | End: 2022-06-03 | Stop reason: HOSPADM

## 2022-06-02 RX ORDER — DEXAMETHASONE SODIUM PHOSPHATE 10 MG/ML
6 INJECTION INTRAMUSCULAR; INTRAVENOUS EVERY 6 HOURS
Status: DISCONTINUED | OUTPATIENT
Start: 2022-06-02 | End: 2022-06-03 | Stop reason: HOSPADM

## 2022-06-02 RX ORDER — ONDANSETRON 2 MG/ML
4 INJECTION INTRAMUSCULAR; INTRAVENOUS EVERY 6 HOURS PRN
Status: DISCONTINUED | OUTPATIENT
Start: 2022-06-02 | End: 2022-06-03 | Stop reason: HOSPADM

## 2022-06-02 RX ORDER — ACETAMINOPHEN 650 MG/1
650 SUPPOSITORY RECTAL EVERY 6 HOURS PRN
Status: DISCONTINUED | OUTPATIENT
Start: 2022-06-02 | End: 2022-06-03 | Stop reason: HOSPADM

## 2022-06-02 RX ORDER — INSULIN LISPRO 100 [IU]/ML
0-12 INJECTION, SOLUTION INTRAVENOUS; SUBCUTANEOUS EVERY 4 HOURS
Status: DISCONTINUED | OUTPATIENT
Start: 2022-06-02 | End: 2022-06-02

## 2022-06-02 RX ORDER — SODIUM CHLORIDE 9 MG/ML
INJECTION, SOLUTION INTRAVENOUS PRN
Status: DISCONTINUED | OUTPATIENT
Start: 2022-06-02 | End: 2022-06-03

## 2022-06-02 RX ORDER — DEXTROSE MONOHYDRATE 50 MG/ML
100 INJECTION, SOLUTION INTRAVENOUS PRN
Status: DISCONTINUED | OUTPATIENT
Start: 2022-06-02 | End: 2022-06-03

## 2022-06-02 RX ORDER — ACETAMINOPHEN 325 MG/1
650 TABLET ORAL EVERY 6 HOURS PRN
Status: DISCONTINUED | OUTPATIENT
Start: 2022-06-02 | End: 2022-06-03 | Stop reason: HOSPADM

## 2022-06-02 RX ORDER — POLYETHYLENE GLYCOL 3350 17 G/17G
17 POWDER, FOR SOLUTION ORAL DAILY PRN
Status: DISCONTINUED | OUTPATIENT
Start: 2022-06-02 | End: 2022-06-03 | Stop reason: HOSPADM

## 2022-06-02 RX ORDER — INSULIN LISPRO 100 [IU]/ML
0-6 INJECTION, SOLUTION INTRAVENOUS; SUBCUTANEOUS NIGHTLY
Status: DISCONTINUED | OUTPATIENT
Start: 2022-06-02 | End: 2022-06-03 | Stop reason: HOSPADM

## 2022-06-02 RX ORDER — ONDANSETRON 4 MG/1
4 TABLET, ORALLY DISINTEGRATING ORAL EVERY 8 HOURS PRN
Status: DISCONTINUED | OUTPATIENT
Start: 2022-06-02 | End: 2022-06-03 | Stop reason: HOSPADM

## 2022-06-02 RX ORDER — INSULIN LISPRO 100 [IU]/ML
0-12 INJECTION, SOLUTION INTRAVENOUS; SUBCUTANEOUS
Status: DISCONTINUED | OUTPATIENT
Start: 2022-06-03 | End: 2022-06-03 | Stop reason: HOSPADM

## 2022-06-02 RX ORDER — SODIUM CHLORIDE 0.9 % (FLUSH) 0.9 %
5-40 SYRINGE (ML) INJECTION EVERY 12 HOURS SCHEDULED
Status: DISCONTINUED | OUTPATIENT
Start: 2022-06-02 | End: 2022-06-03 | Stop reason: HOSPADM

## 2022-06-02 RX ORDER — SODIUM CHLORIDE 0.9 % (FLUSH) 0.9 %
5-40 SYRINGE (ML) INJECTION PRN
Status: DISCONTINUED | OUTPATIENT
Start: 2022-06-02 | End: 2022-06-03 | Stop reason: HOSPADM

## 2022-06-02 RX ADMIN — DEXAMETHASONE SODIUM PHOSPHATE 10 MG: 10 INJECTION, SOLUTION INTRAMUSCULAR; INTRAVENOUS at 02:02

## 2022-06-02 RX ADMIN — DEXAMETHASONE SODIUM PHOSPHATE 10 MG: 10 INJECTION, SOLUTION INTRAMUSCULAR; INTRAVENOUS at 12:38

## 2022-06-02 RX ADMIN — SODIUM CHLORIDE: 9 INJECTION, SOLUTION INTRAVENOUS at 00:23

## 2022-06-02 RX ADMIN — ENOXAPARIN SODIUM 30 MG: 100 INJECTION SUBCUTANEOUS at 09:37

## 2022-06-02 RX ADMIN — FLUTICASONE PROPIONATE 1 PUFF: 110 AEROSOL, METERED RESPIRATORY (INHALATION) at 08:00

## 2022-06-02 RX ADMIN — DEXAMETHASONE SODIUM PHOSPHATE 6 MG: 10 INJECTION INTRAMUSCULAR; INTRAVENOUS at 20:40

## 2022-06-02 RX ADMIN — SODIUM CHLORIDE 3000 MG: 900 INJECTION INTRAVENOUS at 09:36

## 2022-06-02 RX ADMIN — SODIUM CHLORIDE, PRESERVATIVE FREE 10 ML: 5 INJECTION INTRAVENOUS at 20:41

## 2022-06-02 RX ADMIN — SODIUM CHLORIDE 3000 MG: 900 INJECTION INTRAVENOUS at 20:52

## 2022-06-02 RX ADMIN — SODIUM CHLORIDE 3000 MG: 900 INJECTION INTRAVENOUS at 14:39

## 2022-06-02 RX ADMIN — SODIUM CHLORIDE 3000 MG: 900 INJECTION INTRAVENOUS at 02:03

## 2022-06-02 RX ADMIN — SODIUM CHLORIDE: 9 INJECTION, SOLUTION INTRAVENOUS at 20:51

## 2022-06-02 ASSESSMENT — PAIN DESCRIPTION - LOCATION: LOCATION: THROAT

## 2022-06-02 ASSESSMENT — PAIN SCALES - GENERAL: PAINLEVEL_OUTOF10: 1

## 2022-06-02 NOTE — PROGRESS NOTES
Pt admitted into [address] and oriented.  Dr Augusta Walls notified of admission call light in reach will cont to monitor

## 2022-06-02 NOTE — PROGRESS NOTES
Patient bed at [facility] obtained; [address] access scheduling transport.

## 2022-06-02 NOTE — H&P
[facility]  Office: [address], DO, Zekechasity Baez, DO, Jcak Rosie, DO, Felicia Kyle Hernández, DO, Yoshi Martell MD, Kourtney Real MD, Sada Isidro MD, Hernandez Nuno MD, Beryle Marseille, MD, Ciro Pallas, MD, Calvin Vanegas MD, Jennifer Horton DO, Nato Santos MD,  Catrina Presley DO, Roby Loaiza MD, Theresa Yo MD, Monie Keenan MD, Carlos Saez DO, Keturah Cunha MD, Bess Robins MD, Sundeep [facility], DO, Lexus Mckeon MD, Anju Peralta, [facility], [facility], CNP, Roshan Bland, CNP, Cristina Mcclure, CNP, Jaya Love, CNP, Charan Bryan, CNP, Martinez Garcia PA-C, Mortimer Guardian, DNP, Maite Colón, CNP, Elsa Hobbs, CNP, Arabella Morgan, CNP, Daquan Reid, CNS, Lavonne Frankel, [facility], [place]), Mabel Estrada, CNP, Tristen So, CNP, Prem Quiroz, [address]    HISTORY AND PHYSICAL EXAMINATION            Date:   6/2/2022  Patient name:  Troy Galaviz  Date of admission:  6/2/2022  3:46 PM  MRN:   [id number]  Account:  [de-identified]  YOB: 1995  PCP:    Gisella Chin MD  Room:   [facility]/5108-  Code Status:    Full Code    Chief Complaint:     Tonsillitis        History Obtained From:     patient    History of Present Illness: This is a 28 yo female patient who presented to [facility] as a transfer from [facility] for ENT consultation given concern for celia tonsillar abscess requiring drainage. The patient states that her symptoms began earlier this week with sore throat and progressed into having difficulty swallowing and dyspnea. She denies having any fevers or any other complaints. CT soft tissue neck was done on 5/31 and showed extensive pharyngitis/tonsillitis with concern for early tonsillar/peritonsillar abscess formation on the right. Currently the patient is resting comfortably in bed. She states that she is feeling better at this time than earlier in the day.  She denies any pain at this time and would like to try and eat something. No other complaints or concerns. Past Medical History:     Past Medical History:   Diagnosis Date    Asthma     DM (diabetes mellitus), type 2 ([address] 2013    Headache(784.0)     Obesity         Past Surgical History:     No past surgical history on file. Medications Prior to Admission:     Prior to Admission medications    Medication Sig Start Date End Date Taking? Authorizing Provider   dexamethasone, PF, (DECADRON) 10 MG/ML injection Infuse 1 mL intravenously every 6 hours for 3 days 22  Cee Arguelles MD   ampicillin-sulbactam (UNASYN) infusion Infuse 1,500 mg intravenously every 6 hours for 1 day Compound per protocol 22  Cee Arguelles MD   FLOVENT  MCG/ACT inhaler INHALE TWO PUFFS TWICE DAILY 13   Kayleen Topete MD   PROAIR  (90 BASE) MCG/ACT inhaler INHALE ONE TO TWO PUFFS FOUR TIMES DAILY 13   Kayleen Topete MD        Allergies:     Patient has no known allergies. Social History:     Tobacco:    reports that she has never smoked. She has never used smokeless tobacco.  Alcohol:      has no history on file for alcohol use. Drug Use:  has no history on file for drug use. Family History:     No family history on file. Review of Systems:     Positive and Negative as described in HPI.     12 point ROS performed and negative for anything other than what was stated in subjective     Physical Exam:   /72   Pulse 80   Temp 98 °F (36.7 °C) (Oral)   Resp 16   Ht 5' 5\" (1.651 m)   Wt (!) 314 lb (142.4 kg)   BMI 52.25 kg/m²   Temp (24hrs), Av °F (36.7 °C), Min:97.7 °F (36.5 °C), Max:98.2 °F (36.8 °C)    Recent Labs     22  1608 22  2042 22  0616 22  1117   POCGLU 103 128* 129* 116*     No intake or output data in the 24 hours ending 22 1627    General Appearance: alert, well appearing, and in no acute distress  Mental status: oriented to person, place, and time  Head: normocephalic, atraumatic  Eye: no icterus, redness, pupils equal and reactive, extraocular eye movements intact, conjunctiva clear  Ear: normal external ear, no discharge, hearing intact  Nose: no drainage noted  Mouth: tonsils erythematous and swollen with exudates  Neck: supple, no carotid bruits, thyroid not palpable  Lungs: Bilateral equal air entry, clear to ausculation, no wheezing, rales or rhonchi, normal effort  Cardiovascular: normal rate, regular rhythm, no murmur, gallop, rub  Abdomen: Soft, nontender, nondistended, normal bowel sounds, no hepatomegaly or splenomegaly  Neurologic: There are no new focal motor or sensory deficits, normal muscle tone and bulk, no abnormal sensation, normal speech, cranial nerves II through XII grossly intact  Skin: No gross lesions, rashes, bruising or bleeding on exposed skin area  Extremities: peripheral pulses palpable, no pedal edema or calf pain with palpation  Psych: normal affect    Investigations:      Laboratory Testing:  Recent Results (from the past 24 hour(s))   POC Glucose Fingerstick    Collection Time: 06/01/22  8:42 PM   Result Value Ref Range    POC Glucose 128 (H) 65 - 105 mg/dL   Basic Metabolic Panel w/ Reflex to MG    Collection Time: 06/02/22  5:38 AM   Result Value Ref Range    Glucose 138 (H) 70 - 99 mg/dL    BUN 12 6 - 20 mg/dL    CREATININE 0.92 (H) 0.50 - 0.90 mg/dL    Bun/Cre Ratio 13 9 - 20    Calcium 9.2 8.6 - 10.4 mg/dL    Sodium 140 135 - 144 mmol/L    Potassium 4.4 3.7 - 5.3 mmol/L    Chloride 105 98 - 107 mmol/L    CO2 26 20 - 31 mmol/L    Anion Gap 9 9 - 17 mmol/L    GFR Non-African American >60 >60 mL/min    GFR African American >60 >60 mL/min    GFR Comment         CBC auto differential    Collection Time: 06/02/22  5:38 AM   Result Value Ref Range    WBC 25.5 (H) 3.5 - 11.3 k/uL    RBC 4.38 3.95 - 5.11 m/uL    Hemoglobin 11.5 (L) 11.9 - 15.1 g/dL    Hematocrit 36.1 (L) 36.3 - 47.1 % MCV 82.4 (L) 82.6 - 102.9 fL    MCH 26.3 25.2 - 33.5 pg    MCHC 31.9 28.4 - 34.8 g/dL    RDW 15.3 (H) 11.8 - 14.4 %    Platelets 549 785 - 782 k/uL    MPV 8.7 8.1 - 13.5 fL    Seg Neutrophils 89 (H) 36 - 65 %    Lymphocytes 8 (L) 24 - 43 %    Monocytes 3 3 - 12 %    Eosinophils % 0 (L) 1 - 4 %    Basophils 0 0 - 2 %    Immature Granulocytes 0 0 %    Segs Absolute 22.69 (H) 1.50 - 8.10 k/uL    Absolute Lymph # 2.04 1.10 - 3.70 k/uL    Absolute Mono # 0.77 0.10 - 1.20 k/uL    Absolute Eos # 0.00 0.00 - 0.44 k/uL    Basophils Absolute 0.00 0.00 - 0.20 k/uL    Absolute Immature Granulocyte 0.00 0.00 - 0.30 k/uL   POC Glucose Fingerstick    Collection Time: 06/02/22  6:16 AM   Result Value Ref Range    POC Glucose 129 (H) 65 - 105 mg/dL   POC Glucose Fingerstick    Collection Time: 06/02/22 11:17 AM   Result Value Ref Range    POC Glucose 116 (H) 65 - 105 mg/dL       Imaging/Diagnostics:  CT SOFT TISSUE NECK W CONTRAST    Result Date: 5/31/2022  1. Extensive pharyngitis/tonsillitis with patchy areas of low attenuation concerning for early tonsillar/peritonsillar abscess formation on the right with intervening areas of soft tissue suggesting phlegmonous components with conglomerate measurement of 2 x 2 cm in transaxial dimensions. 2. Mild right-sided cervical lymphadenopathy, likely reactive. Assessment :      Hospital Problems           Last Modified POA    * (Principal) Acute tonsillitis 6/2/2022 Yes    Peritonsillar abscess 6/2/2022 Yes          Plan:     Patient status inpatient in the Med/Surge    1.  Peritonsillar abscess   - ENT consulted for possible drainage   - continue unasyn   - continue decadron   - monitor airway   - v/s per unit protocol   - will advance diet as tolerated       Consultations:   [address]      Patient is admitted as inpatient status because of co-morbidities listed above, severity of signs and symptoms as outlined, requirement for current medical therapies and most importantly because of direct risk to patient if care not provided in a hospital setting. Expected length of stay > 48 hours.     Melvi Jaime MD  6/2/2022  4:27 PM    Copy sent to Dr. Bebeto Higgins MD

## 2022-06-02 NOTE — PLAN OF CARE
Problem: Discharge Planning  Goal: Discharge to home or other facility with appropriate resources  6/2/2022 0547 by Delfin Ng RN  Outcome: Progressing  6/1/2022 1645 by Tameka Angel RN  Outcome: Progressing  Flowsheets (Taken 6/1/2022 1974)  Discharge to home or other facility with appropriate resources: Identify barriers to discharge with patient and caregiver     Problem: Pain  Goal: Verbalizes/displays adequate comfort level or baseline comfort level  6/2/2022 0547 by Delfin Ng RN  Outcome: Progressing  6/1/2022 1645 by Tameka Angel RN  Outcome: Progressing     Problem: Safety - Adult  Goal: Free from fall injury  6/2/2022 0547 by Delfin Ng RN  Outcome: Progressing  6/1/2022 1645 by Tameka Angel RN  Outcome: Progressing     Problem: Chronic Conditions and Co-morbidities  Goal: Patient's chronic conditions and co-morbidity symptoms are monitored and maintained or improved  6/2/2022 0547 by Delfin Ng RN  Outcome: Progressing  6/1/2022 1645 by Tameka Angel RN  Outcome: Progressing  Flowsheets (Taken 6/1/2022 0806)  Care Plan - Patient's Chronic Conditions and Co-Morbidity Symptoms are Monitored and Maintained or Improved: Monitor and assess patient's chronic conditions and comorbid symptoms for stability, deterioration, or improvement

## 2022-06-02 NOTE — PROGRESS NOTES
The patient was transferred at this time to [facility] per [facility]. Report called to nurse Charan Villareal.

## 2022-06-02 NOTE — CONSULTS
CONSULTING SERVICE: Otolaryngology-Head and Neck Surgery    REASON FOR CONSULT:  Nicky Zaidi is a 27 y.o. female seen today for tonsillitis    HISTORY OF PRESENT ILLNESS:   Dossie Kocher presents with a several day history of pharyngitis. She was initially managed at outside hospital with IV ABX. She has persistent pharyngeal pain and CT was obtained yesterday with concern for possible developing tonsillar abscess. She was transferred to [facility] today for further care and subspecialist assessment. Juani reports mild odynophagia, no trismus, no limited neck range of motion. No history of recurrent strep or previous peritonsillar abscess. REVIEW OF SYSTEMS:   General ROS: negative  Psychological ROS: negative  Ophthalmic ROS: negative  ENT ROS: throat pain  Allergy and Immunology ROS: negative  Hematological and Lymphatic ROS: negative  Endocrine ROS: negative  Respiratory ROS: no cough, shortness of breath, wheezing  Cardiovascular ROS: no chest pain or dyspnea on exertion  Gastrointestinal ROS: \"no abdominal pain, diarrhea, tarry stools, change in bowel habit  Musculoskeletal ROS: negative    PAST HISTORY:   Past Medical History:   Diagnosis Date    Asthma     DM (diabetes mellitus), type 2 ([facility] [address] 8/12/2013    Headache(784.0)     Obesity      No family history.      Surgical history:  C section        Social Connections:     Frequency of Communication with Friends and Family: Not on file    Frequency of Social Gatherings with Friends and Family: Not on file    Attends Bahai Services: Not on file    Active Member of Clubs or Organizations: Not on file    Attends Club or Organization Meetings: Not on file    Marital Status: Not on file        MEDICATIONS:   Current Facility-Administered Medications   Medication Dose Route Frequency Provider Last Rate Last Admin    ampicillin-sulbactam (UNASYN) 3000 mg in 100 mL NS IVPB minibag  3,000 mg IntraVENous Q6H Henna Lente, DO        sodium chloride flush 0.9 % injection 5-40 mL  5-40 mL IntraVENous 2 times per day Vilinda Bliss, DO        sodium chloride flush 0.9 % injection 5-40 mL  5-40 mL IntraVENous PRN Vilinda Bliss, DO        0.9 % sodium chloride infusion   IntraVENous PRN Vilinda Bliss, DO        ondansetron (ZOFRAN-ODT) disintegrating tablet 4 mg  4 mg Oral Q8H PRN Vilinda Bliss, DO        Or    ondansetron [facility]) injection 4 mg  4 mg IntraVENous Q6H PRN Vilinda Bliss, DO        acetaminophen (TYLENOL) tablet 650 mg  650 mg Oral Q6H PRN Vilinda Bliss, DO        Or    acetaminophen (TYLENOL) suppository 650 mg  650 mg Rectal Q6H PRN Vilinda Bliss, DO        polyethylene glycol (GLYCOLAX) packet 17 g  17 g Oral Daily PRN Vilinda Bliss, DO        albuterol sulfate  (90 Base) MCG/ACT inhaler 2 puff  2 puff Inhalation Q6H PRN Vilinda Bliss, DO        dexamethasone (DECADRON) injection 6 mg  6 mg IntraVENous Q6H Vilinda Bliss, DO        glucose chewable tablet 16 g  4 tablet Oral PRN Vilinda Bliss, DO        dextrose bolus 10% 125 mL  125 mL IntraVENous PRN Vilinda Bliss, DO        Or    dextrose bolus 10% 250 mL  250 mL IntraVENous PRN Vilinda Bliss, DO        glucagon injection 1 mg  1 mg IntraMUSCular PRN Vilinda Bliss, DO        dextrose 5 % solution  100 mL/hr IntraVENous PRN Vilinda Bliss, DO        insulin lispro (HUMALOG) injection vial 0-12 Units  0-12 Units SubCUTAneous Q4H Vilinda Bliss, DO            ALLERGIES:   No Known Allergies     PHYSICAL EXAM:  Vitals:    06/02/22 1549 06/02/22 1913   BP: 120/72 104/62   Pulse: 80 77   Resp: 16 18   Temp: 98 °F (36.7 °C) 97.9 °F (36.6 °C)   TempSrc: Oral Oral   SpO2:  100%   Weight: (!) 314 lb (142.4 kg)    Height: 5' 5\" (1.651 m)       GENERAL: well developed and well nourished and in no acute distress  HEAD: normocephalic and atraumatic  EYES: no eyelid swelling, no conjunctival injection or exudate, pupils equal round and reactive to light  EXTERNAL EARS: normal  EAR EXAM: normal TMs bilaterally and normal canals bilaterally  NOSE: nares patent, normal mucosa  MOUTH/THROAT: mucous membranes moist, tonsillar exudate - bilateral, mild erythema, no drooling and no trismus. No palatal asymmetry or fullness  NECK: non-tender, full range of motion, no mass, no focal lymphadenopathy  RESPIRATORY: Normal expansion. Clear to auscultation. No rales, rhonchi, or wheezing. NEUROLOGICAL:  cranial nerves II-XII are grossly intact    RELEVANT LABS/STUDIES:  CT reviewed. Intratonsillar phlegmon without abscess    IMPRESSION:  Tonsillits    RECOMMENDATIONS/PLAN:  Continue medical management. Defer antimicrobial selection/route/duration to primary team.   No surgical intervention recommended at this time.     No need for NPO  Will reassess in AM.   --------------------------------------------------  Carlos Davenport MD  Pediatric Otolaryngology-Head and Neck Surgery  [address] [place] [address] Otolaryngology group    Office  ph# [phone]    Also available in [facility]

## 2022-06-03 VITALS
HEART RATE: 76 BPM | WEIGHT: 293 LBS | RESPIRATION RATE: 19 BRPM | DIASTOLIC BLOOD PRESSURE: 82 MMHG | TEMPERATURE: 98.5 F | SYSTOLIC BLOOD PRESSURE: 111 MMHG | HEIGHT: 65 IN | OXYGEN SATURATION: 95 % | BODY MASS INDEX: 48.82 KG/M2

## 2022-06-03 LAB
ALBUMIN SERPL-MCNC: 3.5 G/DL (ref 3.5–5.2)
ANION GAP SERPL CALCULATED.3IONS-SCNC: 11 MMOL/L (ref 9–17)
BUN BLDV-MCNC: 15 MG/DL (ref 6–20)
CALCIUM SERPL-MCNC: 9.3 MG/DL (ref 8.6–10.4)
CHLORIDE BLD-SCNC: 107 MMOL/L (ref 98–107)
CO2: 25 MMOL/L (ref 20–31)
CREAT SERPL-MCNC: 0.87 MG/DL (ref 0.5–0.9)
CULTURE: NORMAL
GFR AFRICAN AMERICAN: >60 ML/MIN
GFR NON-AFRICAN AMERICAN: >60 ML/MIN
GFR SERPL CREATININE-BSD FRML MDRD: ABNORMAL ML/MIN/{1.73_M2}
GLUCOSE BLD-MCNC: 106 MG/DL (ref 65–105)
GLUCOSE BLD-MCNC: 114 MG/DL (ref 70–99)
GLUCOSE BLD-MCNC: 127 MG/DL (ref 65–105)
GLUCOSE BLD-MCNC: 161 MG/DL (ref 65–105)
HCT VFR BLD CALC: 33.6 % (ref 36.3–47.1)
HEMOGLOBIN: 11.3 G/DL (ref 11.9–15.1)
MAGNESIUM: 2.2 MG/DL (ref 1.6–2.6)
MCH RBC QN AUTO: 26.7 PG (ref 25.2–33.5)
MCHC RBC AUTO-ENTMCNC: 33.6 G/DL (ref 28.4–34.8)
MCV RBC AUTO: 79.2 FL (ref 82.6–102.9)
NRBC AUTOMATED: 0 PER 100 WBC
PDW BLD-RTO: 15.1 % (ref 11.8–14.4)
PHOSPHORUS: 3.4 MG/DL (ref 2.6–4.5)
PLATELET # BLD: 407 K/UL (ref 138–453)
PMV BLD AUTO: 9.1 FL (ref 8.1–13.5)
POTASSIUM SERPL-SCNC: 4.2 MMOL/L (ref 3.7–5.3)
RBC # BLD: 4.24 M/UL (ref 3.95–5.11)
SODIUM BLD-SCNC: 143 MMOL/L (ref 135–144)
SPECIMEN DESCRIPTION: NORMAL
WBC # BLD: 16.4 K/UL (ref 3.5–11.3)

## 2022-06-03 PROCEDURE — 2580000003 HC RX 258: Performed by: INTERNAL MEDICINE

## 2022-06-03 PROCEDURE — 36415 COLL VENOUS BLD VENIPUNCTURE: CPT

## 2022-06-03 PROCEDURE — 99232 SBSQ HOSP IP/OBS MODERATE 35: CPT | Performed by: OTOLARYNGOLOGY

## 2022-06-03 PROCEDURE — 85027 COMPLETE CBC AUTOMATED: CPT

## 2022-06-03 PROCEDURE — 96366 THER/PROPH/DIAG IV INF ADDON: CPT

## 2022-06-03 PROCEDURE — 96376 TX/PRO/DX INJ SAME DRUG ADON: CPT

## 2022-06-03 PROCEDURE — 99217 PR OBSERVATION CARE DISCHARGE MANAGEMENT: CPT | Performed by: FAMILY MEDICINE

## 2022-06-03 PROCEDURE — G0378 HOSPITAL OBSERVATION PER HR: HCPCS

## 2022-06-03 PROCEDURE — 80069 RENAL FUNCTION PANEL: CPT

## 2022-06-03 PROCEDURE — 82947 ASSAY GLUCOSE BLOOD QUANT: CPT

## 2022-06-03 PROCEDURE — 6360000002 HC RX W HCPCS: Performed by: INTERNAL MEDICINE

## 2022-06-03 PROCEDURE — 83735 ASSAY OF MAGNESIUM: CPT

## 2022-06-03 RX ORDER — AMOXICILLIN AND CLAVULANATE POTASSIUM 875; 125 MG/1; MG/1
1 TABLET, FILM COATED ORAL 2 TIMES DAILY
Qty: 14 TABLET | Refills: 0 | Status: SHIPPED | OUTPATIENT
Start: 2022-06-03 | End: 2022-06-10

## 2022-06-03 RX ADMIN — SODIUM CHLORIDE: 9 INJECTION, SOLUTION INTRAVENOUS at 04:08

## 2022-06-03 RX ADMIN — SODIUM CHLORIDE 3000 MG: 900 INJECTION INTRAVENOUS at 04:08

## 2022-06-03 RX ADMIN — SODIUM CHLORIDE, PRESERVATIVE FREE 10 ML: 5 INJECTION INTRAVENOUS at 08:43

## 2022-06-03 RX ADMIN — SODIUM CHLORIDE 3000 MG: 900 INJECTION INTRAVENOUS at 08:40

## 2022-06-03 RX ADMIN — DEXAMETHASONE SODIUM PHOSPHATE 6 MG: 10 INJECTION INTRAMUSCULAR; INTRAVENOUS at 08:40

## 2022-06-03 RX ADMIN — DEXAMETHASONE SODIUM PHOSPHATE 6 MG: 10 INJECTION INTRAMUSCULAR; INTRAVENOUS at 04:03

## 2022-06-03 ASSESSMENT — PAIN SCALES - GENERAL: PAINLEVEL_OUTOF10: 1

## 2022-06-03 NOTE — PROGRESS NOTES
ENT/OTOLARYNGOLOGY PROGRESS NOTE     REASON FOR CARE: pharyngitis     HISTORY OF PRESENT ILLNESS:   Karyle Colder is a 27 y.o. who is being seen for follow-up of pharyngitis. She reports improving throat pain. Tolerated some chicken yesterday and drinking well. No neck ROM limitations. PHYSICAL EXAM:      Vitals:    06/02/22 1549 06/02/22 1913   BP: 120/72 104/62   Pulse: 80 77   Resp: 16 18   Temp: 98 °F (36.7 °C) 97.9 °F (36.6 °C)   TempSrc: Oral Oral   SpO2:  100%   Weight: (!) 314 lb (142.4 kg)    Height: 5' 5\" (1.651 m)         GENERAL: well developed and well nourished and in no acute distress  HEAD: normocephalic and atraumatic  EYES: no eyelid swelling, no conjunctival injection or exudate, pupils equal round and reactive to light  EXTERNAL EARS: normal  EAR EXAM: normal TMs bilaterally and normal canals bilaterally  NOSE: nares patent, normal mucosa  MOUTH/THROAT: no uvular deviation or palatal fullness. Erythema R tonsil with exudate bilateral.    NECK: non-tender, full range of motion, no mass, no focal lymphadenopathy  RESPIRATORY: Normal expansion. Clear to auscultation. No rales, rhonchi, or wheezing. NEUROLOGICAL:  cranial nerves II-XII are grossly intact     RELEVANT LABS/STUDIES:      No new     IMPRESSION AND RECOMMENDATIONS:     Pharyngitis       Plan:  Continue IV ABX  No surgical intervention   ENT will sign off.  Please call with questions.     --------------------------------------------------  Michael Montes MD  Pediatric Otolaryngology-Head and Neck Surgery  [address] [place] [address] Otolaryngology group    Office  ph# [phone]    Also available in [facility]

## 2022-06-03 NOTE — PROGRESS NOTES
CLINICAL PHARMACY NOTE: MEDS TO BEDS    Total # of Prescriptions Filled: 1   The following medications were delivered to the patient:  · Augmentin 875-125mg    Additional Documentation: delivered to patient in room 245 6/3 at 10:30am. No co-pay.

## 2022-06-03 NOTE — DISCHARGE SUMMARY
[facility]  Office: [phone]  Be Flavors, DO, Mae Peng, DO, Valerie Jefferson, DO, Edmundo Shay Blood, DO, Astrid Lipscomb MD, Brii Isabel MD, Queen Andrew MD, Rich Arguello MD, Shakira Bridges MD, Mendy Bhatia MD, Aubree Lowery MD, Petty Babin DO, Lei Bailey MD,  Trey Lee DO, Vero Sheppard MD, Patrica Jimenes MD, Laurent Perry MD, Irlanda Bermudez DO, Domenic Murillo MD, Selwyn Yanez MD, Patricia King DO, Hillary Bravo MD, Natalio Caputo, [facility], [facility], CNP, Michael Goodman, CNP, Izzy Mead, CNP, Tho Klein, CNP, Satnam Morgan, CNP, Marie Crow PA-C, Emeka Davies, [facility], Natalee Quintanilla, [facility], Evelin Valerio, CNP, Kenrick Rosario, CNP, Paul Sargent, CNS, Cristina Goff, [facility], Yamilex Quintnailla, CNP, Glendy Edouard, CNP, Luis Angel Prim, [facility]    Discharge Summary     Patient ID: Rc Leon  :  1995   MRN: [id number]     ACCOUNT:  [de-identified]   Patient's PCP: Perri Wharton MD  Admit Date: 2022   Discharge Date: 6/3/2022      Length of Stay: 0  Code Status:  Full Code  Admitting Physician: Vero Sheppard MD  Discharge Physician: Vero Sheppard MD     Active Discharge Diagnoses:     Hospital Problem Lists:  Principal Problem:    Acute tonsillitis  Active Problems:    Peritonsillar abscess  Resolved Problems:    * No resolved hospital problems. *      Admission Condition:  stable     Discharged Condition: stable    Hospital Stay:     Hospital Course:  Rc Leon is a 27 y.o. female who was admitted for the management of    Acute tonsillitis , presented to ER with   No chief complaint on file.     D/c home on augmentin   Seen by ENT and cleared for d/c  Pt seen and examined and doing much better on day of d/c     Significant therapeutic interventions:      Significant Diagnostic Studies:   Labs / Micro:  CBC:   Lab Results   Component Value Date    WBC 16.4 06/03/2022    RBC 4.24 06/03/2022    HGB 11.3 06/03/2022    HCT 33.6 06/03/2022    MCV 79.2 06/03/2022    MCH 26.7 06/03/2022    MCHC 33.6 06/03/2022    RDW 15.1 06/03/2022     06/03/2022     BMP:    Lab Results   Component Value Date    GLUCOSE 114 06/03/2022     06/03/2022    K 4.2 06/03/2022     06/03/2022    CO2 25 06/03/2022    ANIONGAP 11 06/03/2022    BUN 15 06/03/2022    CREATININE 0.87 06/03/2022    BUNCRER 13 06/02/2022    CALCIUM 9.3 06/03/2022    LABGLOM >60 06/03/2022    GFRAA >60 06/03/2022    GFR      06/03/2022     HFP:    Lab Results   Component Value Date    PROT 7.4 06/12/2013     CMP:    Lab Results   Component Value Date    GLUCOSE 114 06/03/2022     06/03/2022    K 4.2 06/03/2022     06/03/2022    CO2 25 06/03/2022    BUN 15 06/03/2022    CREATININE 0.87 06/03/2022    ANIONGAP 11 06/03/2022    ALKPHOS 108 06/12/2013    ALT 14 06/12/2013    AST 18 06/12/2013    BILITOT 0.33 06/12/2013    LABALBU 3.5 06/03/2022    ALBUMIN 1.3 06/12/2013    LABGLOM >60 06/03/2022    GFRAA >60 06/03/2022    GFR      06/03/2022    PROT 7.4 06/12/2013    CALCIUM 9.3 06/03/2022     PT/INR:    Lab Results   Component Value Date    PROTIME 15.1 06/01/2022    INR 1.2 06/01/2022     PTT: No results found for: APTT  FLP:  No results found for: CHOL, TRIG, HDL  U/A:  No results found for: COLORU, TURBIDITY, SPECGRAV, HGBUR, PHUR, PROTEINU, GLUCOSEU, KETUA, BILIRUBINUR, UROBILINOGEN, NITRU, LEUKOCYTESUR  TSH:    Lab Results   Component Value Date    TSH 2.57 06/12/2013         Radiology:  CT SOFT TISSUE NECK W CONTRAST    Result Date: 5/31/2022  1. Extensive pharyngitis/tonsillitis with patchy areas of low attenuation concerning for early tonsillar/peritonsillar abscess formation on the right with intervening areas of soft tissue suggesting phlegmonous components with conglomerate measurement of 2 x 2 cm in transaxial dimensions. 2. Mild right-sided cervical lymphadenopathy, likely reactive. Consultations:    Consults:     Final Specialist Recommendations/Findings:   IP CONSULT TO OTOLARYNGOLOGY      The patient was seen and examined on day of discharge and this discharge summary is in conjunction with any daily progress note from day of discharge. Discharge plan:     Disposition: Home    Physician Follow Up:      Deborah Douglas MD  [address] [place] [address]. [address] [place] [address]  [phone]             Requiring Further Evaluation/Follow Up POST HOSPITALIZATION/Incidental Findings:      Diet: regular diet    Activity: As tolerated     Instructions to Patient:      Discharge Medications:      Medication List      START taking these medications    amoxicillin-clavulanate 875-125 MG per tablet  Commonly known as: AUGMENTIN  Take 1 tablet by mouth 2 times daily for 7 days        CONTINUE taking these medications    Flovent  MCG/ACT inhaler  Generic drug: fluticasone  INHALE TWO PUFFS TWICE DAILY     ProAir  (90 Base) MCG/ACT inhaler  Generic drug: albuterol sulfate HFA  INHALE ONE TO TWO PUFFS FOUR TIMES DAILY        STOP taking these medications    ampicillin-sulbactam  infusion  Commonly known as: UNASYN     dexamethasone (PF) 10 MG/ML injection  Commonly known as: DECADRON           Where to Get Your Medications      These medications were sent to [facility] [address] [facility], [address], [address] [facility] [address]    Phone: [phone]   · amoxicillin-clavulanate 875-125 MG per tablet         No discharge procedures on file. Time Spent on discharge is  35 mins in patient examination, evaluation, counseling as well as medication reconciliation, prescriptions for required medications, discharge plan and follow up. Electronically signed by   Anmol Oconnell MD  6/3/2022  9:14 AM      Thank you Dr. Deborah Douglas MD for the opportunity to be involved in this patient's care.

## 2022-06-05 LAB
CULTURE: NORMAL
CULTURE: NORMAL
Lab: NORMAL
Lab: NORMAL
SPECIMEN DESCRIPTION: NORMAL
SPECIMEN DESCRIPTION: NORMAL
